# Patient Record
Sex: FEMALE | Race: BLACK OR AFRICAN AMERICAN | NOT HISPANIC OR LATINO | URBAN - METROPOLITAN AREA
[De-identification: names, ages, dates, MRNs, and addresses within clinical notes are randomized per-mention and may not be internally consistent; named-entity substitution may affect disease eponyms.]

---

## 2021-01-09 ENCOUNTER — EMERGENCY (EMERGENCY)
Facility: HOSPITAL | Age: 46
LOS: 1 days | Discharge: HOME | End: 2021-01-09
Attending: EMERGENCY MEDICINE | Admitting: EMERGENCY MEDICINE
Payer: MEDICAID

## 2021-01-09 VITALS
DIASTOLIC BLOOD PRESSURE: 76 MMHG | TEMPERATURE: 99 F | HEART RATE: 109 BPM | OXYGEN SATURATION: 98 % | RESPIRATION RATE: 18 BRPM | SYSTOLIC BLOOD PRESSURE: 149 MMHG

## 2021-01-09 LAB
ALBUMIN SERPL ELPH-MCNC: 3.6 G/DL — SIGNIFICANT CHANGE UP (ref 3.3–5)
ALP SERPL-CCNC: 71 U/L — SIGNIFICANT CHANGE UP (ref 40–120)
ALT FLD-CCNC: 14 U/L — SIGNIFICANT CHANGE UP (ref 4–33)
ANION GAP SERPL CALC-SCNC: 8 MMOL/L — SIGNIFICANT CHANGE UP (ref 7–14)
AST SERPL-CCNC: 16 U/L — SIGNIFICANT CHANGE UP (ref 4–32)
BASOPHILS # BLD AUTO: 0.01 K/UL — SIGNIFICANT CHANGE UP (ref 0–0.2)
BASOPHILS NFR BLD AUTO: 0.1 % — SIGNIFICANT CHANGE UP (ref 0–2)
BILIRUB SERPL-MCNC: 0.3 MG/DL — SIGNIFICANT CHANGE UP (ref 0.2–1.2)
BUN SERPL-MCNC: 10 MG/DL — SIGNIFICANT CHANGE UP (ref 7–23)
CALCIUM SERPL-MCNC: 8.8 MG/DL — SIGNIFICANT CHANGE UP (ref 8.4–10.5)
CHLORIDE SERPL-SCNC: 102 MMOL/L — SIGNIFICANT CHANGE UP (ref 98–107)
CO2 SERPL-SCNC: 25 MMOL/L — SIGNIFICANT CHANGE UP (ref 22–31)
CREAT SERPL-MCNC: 0.89 MG/DL — SIGNIFICANT CHANGE UP (ref 0.5–1.3)
EOSINOPHIL # BLD AUTO: 0.03 K/UL — SIGNIFICANT CHANGE UP (ref 0–0.5)
EOSINOPHIL NFR BLD AUTO: 0.3 % — SIGNIFICANT CHANGE UP (ref 0–6)
GLUCOSE SERPL-MCNC: 111 MG/DL — HIGH (ref 70–99)
HCT VFR BLD CALC: 43.2 % — SIGNIFICANT CHANGE UP (ref 34.5–45)
HGB BLD-MCNC: 13.3 G/DL — SIGNIFICANT CHANGE UP (ref 11.5–15.5)
IANC: 6.55 K/UL — SIGNIFICANT CHANGE UP (ref 1.5–8.5)
IMM GRANULOCYTES NFR BLD AUTO: 0.3 % — SIGNIFICANT CHANGE UP (ref 0–1.5)
LYMPHOCYTES # BLD AUTO: 1.12 K/UL — SIGNIFICANT CHANGE UP (ref 1–3.3)
LYMPHOCYTES # BLD AUTO: 12.8 % — LOW (ref 13–44)
MCHC RBC-ENTMCNC: 27.4 PG — SIGNIFICANT CHANGE UP (ref 27–34)
MCHC RBC-ENTMCNC: 30.8 GM/DL — LOW (ref 32–36)
MCV RBC AUTO: 89.1 FL — SIGNIFICANT CHANGE UP (ref 80–100)
MONOCYTES # BLD AUTO: 0.98 K/UL — HIGH (ref 0–0.9)
MONOCYTES NFR BLD AUTO: 11.2 % — SIGNIFICANT CHANGE UP (ref 2–14)
NEUTROPHILS # BLD AUTO: 6.55 K/UL — SIGNIFICANT CHANGE UP (ref 1.8–7.4)
NEUTROPHILS NFR BLD AUTO: 75.3 % — SIGNIFICANT CHANGE UP (ref 43–77)
NRBC # BLD: 0 /100 WBCS — SIGNIFICANT CHANGE UP
NRBC # FLD: 0 K/UL — SIGNIFICANT CHANGE UP
PLATELET # BLD AUTO: 274 K/UL — SIGNIFICANT CHANGE UP (ref 150–400)
POTASSIUM SERPL-MCNC: 3.8 MMOL/L — SIGNIFICANT CHANGE UP (ref 3.5–5.3)
POTASSIUM SERPL-SCNC: 3.8 MMOL/L — SIGNIFICANT CHANGE UP (ref 3.5–5.3)
PROT SERPL-MCNC: 7.8 G/DL — SIGNIFICANT CHANGE UP (ref 6–8.3)
RBC # BLD: 4.85 M/UL — SIGNIFICANT CHANGE UP (ref 3.8–5.2)
RBC # FLD: 14.7 % — HIGH (ref 10.3–14.5)
SODIUM SERPL-SCNC: 135 MMOL/L — SIGNIFICANT CHANGE UP (ref 135–145)
WBC # BLD: 8.72 K/UL — SIGNIFICANT CHANGE UP (ref 3.8–10.5)
WBC # FLD AUTO: 8.72 K/UL — SIGNIFICANT CHANGE UP (ref 3.8–10.5)

## 2021-01-09 PROCEDURE — 99284 EMERGENCY DEPT VISIT MOD MDM: CPT

## 2021-01-09 PROCEDURE — 70487 CT MAXILLOFACIAL W/DYE: CPT | Mod: 26

## 2021-01-09 RX ORDER — IBUPROFEN 200 MG
1 TABLET ORAL
Qty: 12 | Refills: 0
Start: 2021-01-09 | End: 2021-01-11

## 2021-01-09 RX ORDER — KETOROLAC TROMETHAMINE 30 MG/ML
15 SYRINGE (ML) INJECTION ONCE
Refills: 0 | Status: DISCONTINUED | OUTPATIENT
Start: 2021-01-09 | End: 2021-01-09

## 2021-01-09 RX ADMIN — Medication 450 MILLIGRAM(S): at 11:30

## 2021-01-09 RX ADMIN — Medication 15 MILLIGRAM(S): at 10:20

## 2021-01-09 NOTE — ED ADULT TRIAGE NOTE - CHIEF COMPLAINT QUOTE
pt c/o left sided tooth pain and facial swelling. pt states the tooth pain has been going on for two days and she woke up this Am with swelling to the left side of face. Denies fevers/chills.

## 2021-01-09 NOTE — ED PROVIDER NOTE - RESPIRATORY NEGATIVE STATEMENT, MLM
Post-Care Instructions: I reviewed with the patient in detail post-care instructions. Patient is to wear sunprotection, and avoid picking at any of the treated lesions. Pt may apply polysporin to crusted or scabbing areas. Consent: The patient's consent was obtained including but not limited to risks of crusting, scabbing, blistering, scarring, darker or lighter pigmentary change, recurrence, incomplete removal and infection. Number Of Freeze-Thaw Cycles: 2 freeze-thaw cycles Render Post-Care Instructions In Note?: no Duration Of Freeze Thaw-Cycle (Seconds): 5 Detail Level: Detailed no chest pain, no cough, and no shortness of breath.

## 2021-01-09 NOTE — ED PROVIDER NOTE - ENMT, MLM
Face is asymmetrical do to swelling on left cheek, no erythema, no fluctuance, no swelling. Oral exam shows normal teeth, with multiple fillings, no gum swelling at area of facial pain. Throat is clear. Tongue, and uvular  is midline. Face is asymmetrical do to swelling on left cheek; no erythema, no fluctuance, no discharge. Oral exam shows normal teeth, with multiple fillings, no gum swelling at area of facial pain. Throat is clear. Tongue, and uvula are midline.

## 2021-01-09 NOTE — ED PROVIDER NOTE - EYES, MLM
EOMI, Clear bilaterally, pupils equal, round and reactive to light. EOMI, Clear bilaterally, pupils equal, round and reactive to light.  No pain with extraocular movement.

## 2021-01-09 NOTE — ED PROVIDER NOTE - PROGRESS NOTE DETAILS
Dr. Petersen: imaging and labs reviewed--WBC reassuring, CT with facial cellulitis but no drainage collection.  Pt informed of results.  She is very well appearing, in NAD, feels much better after Toradol.  With no drainable collection and pt very well appearing, will give PO dose of clinda and discharge with pain control and remainder of Rx clinda.  Pt has a dentist and was advised that she must follow up with her dentist within 1 week to see if there is an odontogenic cause of this (no gum swelling on oral exam, but cannot exclude other dental disease).  Also will place pt sticker in discharge lounge binder to assist with dental and plastics follow up (if swelling not improving).  Strict return precautions given for worsening symptoms/difficulty swallowing/any concerns.  Pt tolerating PO at time of discharge (drank water to take PO medication).

## 2021-01-09 NOTE — ED PROVIDER NOTE - PATIENT PORTAL LINK FT
You can access the FollowMyHealth Patient Portal offered by [facility] by registering at the following website: [email or web address]. By joining [facility]’s FollowMyHealth portal, you will also be able to view your health information using other applications (apps) compatible with our system.

## 2021-01-09 NOTE — ED PROVIDER NOTE - OBJECTIVE STATEMENT
44 y/o F with no PMHx presents to the ED c/o 2 day of pain left sided cheek/mouth and 1 day of swelling to left cheek. Pt denies fever, visual changes, sore throat no difficulty swallowing. Pt is able to tolerate PO, but not on affected side. Pt denies any recent dental work. Pt took 1 dose of Clindamycin that her mother gave her last night. 46 y/o F with no PMHx presents to the ED c/o 2 day of pain left sided cheek/mouth and 1 day of swelling to left cheek. Pt denies fever, visual changes, sore throat, no difficulty swallowing. Pt is able to tolerate PO, but not on affected side due to pain in cheek. Pt denies any recent dental work. Pt took 1 dose of Clindamycin that her mother gave her last night.

## 2021-01-09 NOTE — ED PROVIDER NOTE - NSFOLLOWUPINSTRUCTIONS_ED_ALL_ED_FT
1. TAKE ALL OF YOUR PRESCRIBED OR OVER THE COUNTER MEDICATIONS AS DIRECTED.    2. FOR PAIN OR FEVER YOU CAN TAKE IBUPROFEN (MOTRIN, ADVIL), NAPROXEN (ALLEVE) OR ACETAMINOPHEN (TYLENOL) AS NEEDED, AS DIRECTED ON PACKAGING.  3. FOLLOW UP WITH YOUR DENTIST WITHIN 7 DAYS, OR SOONER IF DIRECTED.  IF YOUR FACIAL SWELLING DOES NOT BEGIN TO IMPROVE WITHIN 1 WEEK, MAKE AN APPOINTMENT WITH A PLASTIC SURGEON BY CALLING [phone] FOR THE CLINIC, OR BY CALLING ANYONE ON THE FOLLOW UP LIST YOU WERE GIVEN.  4. IF YOU HAD LABS OR IMAGING DONE, YOU WERE GIVEN COPIES OF ALL LABS AND/OR IMAGING RESULTS FROM YOUR ER VISIT--PLEASE TAKE THEM WITH YOU TO YOUR FOLLOW UP APPOINTMENTS.  5. IF NEEDED, CALL PATIENT ACCESS SERVICES AT [phone] TO FIND A PRIMARY CARE PHYSICIAN.  OR CALL [phone] TO MAKE AN APPOINTMENT WITH THE CLINIC.  6. YOU CAN FIND PHYSICIANS OF ALL SPECIALITIES BY VISITING [facility] AND CLICKING ON "FIND A DOCTOR".  7. RETURN TO THE ER FOR ANY WORSENING SYMPTOMS OR CONCERNS.    THANK YOU FOR COMING TO [facility].  HAVE A NICE DAY.

## 2021-01-09 NOTE — ED PROVIDER NOTE - CLINICAL SUMMARY MEDICAL DECISION MAKING FREE TEXT BOX
44 y/o F with no PMHx presents to the ED c/o 2 day of pain left sided cheek/mouth and 1 day of swelling to left cheek.   Consider preseptal cellulitis vs facial abscess unlikely tooth infection. Plan to treat pain, and get imaging and reassess.

## 2021-12-22 ENCOUNTER — EMERGENCY (EMERGENCY)
Facility: HOSPITAL | Age: 46
LOS: 1 days | Discharge: HOME | End: 2021-12-22
Admitting: EMERGENCY MEDICINE
Payer: MEDICAID

## 2021-12-22 VITALS
HEART RATE: 109 BPM | OXYGEN SATURATION: 100 % | DIASTOLIC BLOOD PRESSURE: 200 MMHG | RESPIRATION RATE: 18 BRPM | TEMPERATURE: 98 F | WEIGHT: 261.91 LBS | SYSTOLIC BLOOD PRESSURE: 92 MMHG

## 2021-12-22 VITALS — SYSTOLIC BLOOD PRESSURE: 162 MMHG | DIASTOLIC BLOOD PRESSURE: 106 MMHG

## 2021-12-22 PROBLEM — Z78.9 OTHER SPECIFIED HEALTH STATUS: Chronic | Status: ACTIVE | Noted: 2021-01-09

## 2021-12-22 LAB — SARS-COV-2 RNA SPEC QL NAA+PROBE: SIGNIFICANT CHANGE UP

## 2021-12-22 PROCEDURE — 99282 EMERGENCY DEPT VISIT SF MDM: CPT

## 2021-12-22 NOTE — ED PROVIDER NOTE - CLINICAL SUMMARY MEDICAL DECISION MAKING FREE TEXT BOX
47 y/o female asymptomatic - requesting covid19 test  -will send covid19 pcr  -pt to follow up results  -isolation precautions given

## 2021-12-22 NOTE — ED PROVIDER NOTE - OBJECTIVE STATEMENT
47 y/o female hx HTN presents to ER requesting a covid test. Pt. 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 Pt. denies any complaints at this time. Denies headache fever chills cough sob uri symptoms.   Vaccinated for covid19.

## 2021-12-22 NOTE — ED PROVIDER NOTE - NSFOLLOWUPINSTRUCTIONS_ED_ALL_ED_FT
COVID-19 (Coronavirus Disease 2019)    WHAT YOU NEED TO KNOW:    What do I need to know about coronavirus disease 2019 (COVID-19)? COVID-19 is the disease caused by the novel (new) coronavirus first discovered in December 2019. Coronaviruses generally cause upper respiratory (nose, throat, and lung) infections, such as a cold. The new virus can also cause serious lower respiratory conditions, such as pneumonia or acute respiratory distress syndrome (ARDS). Anyone can develop serious problems from the new virus, but your risk is higher if you are 65 or older. A weak immune system, diabetes, or a heart or lung condition can also increase your risk.    What are the signs and symptoms of COVID-19? You may not develop any signs or symptoms. Signs and symptoms that do develop usually start about 5 days after infection but can take 2 to 14 days. Signs and symptoms range from mild to severe. You may feel like you have the flu or a bad cold. Information on COVID-19 is still being learned. Tell your healthcare provider if you think you were infected but develop signs or symptoms not listed below:  •A cough  •Shortness of breath or trouble breathing that may become severe  •A fever of at least 100.4°F, or 38°C (may be lower in adults 65 or older)  •Chills that might include shaking  •Muscle pain, body aches, or a headache  •A sore throat  •Suddenly not being able to taste or smell anything  •Feeling mentally and physically tired (fatigue)  •Congestion (stuffy head and nose), or a runny nose.  •Diarrhea, nausea, or vomiting    How is COVID-19 diagnosed? If you think you have COVID-19, call your healthcare provider. In some areas, testing is only done if a person has severe symptoms or is hospitalized. Testing is done more widely in other places. Your provider will tell you what to do based on your symptoms and the rules in your area. In general, the following may be used:   •A viral test shows if you have a current infection. Samples are taken from your nose and throat, usually with swabs. You may need to wait several days to get the test results. Your healthcare provider will tell you how to get your results. You will need to quarantine (stay physically away from others) until you get your results. If results show you have COVID-19, you will need to quarantine until you are well. Your provider or other health official may give you more directions. You will also need to prevent another infection until it is known if you can get COVID-19 again.  •An antibody test shows if you had a past infection. Blood samples are used for this test. Antibodies are made by your immune system to attack the virus that causes COVID-19. Antibodies will form 1 to 3 weeks after you are infected. It is not known if antibodies prevent a second infection, or for how long a person might be protected. If you have antibodies, you will still need to be careful around others until more is known.  •CT scans or x-rays may be used to check for signs of pneumonia. The 2019 coronavirus causes a specific kind of pneumonia, usually in both lungs.      How is COVID-19 treated? No medicine or specific treatment is currently approved for COVID-19. The following may be used to manage your symptoms or treat the effects of COVID-19:   •Mild symptoms may get better on their own. If you do not need to be treated in a hospital, you will be given instructions to use at home. Your condition will be closely monitored. You will need to watch for worsening symptoms and seek immediate care if needed. Talk to your healthcare provider about the following:?Relieve your symptoms. To soothe a sore throat, gargle with warm salt water, or use throat lozenges or a throat spray. Your healthcare provider may recommend a cough medicine. Drink more liquids to thin and loosen mucus and to prevent dehydration. Use decongestants or saline drops as directed for nasal congestion.  ?NSAIDs or acetaminophen can help lower a fever and relieve body aches or a headache. Follow directions. If not taken correctly, NSAIDs can cause kidney damage and acetaminophen can cause liver damage.    •Severe or life-threatening symptoms are treated in the hospital. You may need a combination of the following:?Medicines may be given to reduce inflammation or to fight the virus. You may also need blood thinners to prevent or treat blood clots. If you have a deep vein thrombosis (DVT) or pulmonary embolism (PE), you may need to keep using blood thinners for 3 months.  ?Extra oxygen may be given if you have respiratory failure. This means your lungs cannot get enough oxygen into your blood and out to your organs. Extra oxygen can help prevent organ failure.  ?A ventilator may be used to help you breathe.  ?Convalescent plasma (part of blood) from a patient who has recovered from COVID-19 may be used. The plasma contains antibodies that can help your body fight the infection. Convalescent plasma is only given to patients who have severe signs and symptoms.        How does the 2019 coronavirus spread? The virus spreads quickly and easily. You can become infected if you are in contact with a large amount of the virus, even for a short time. You can also become infected by being around a small amount of virus for a long time. The following are ways the virus is thought to spread, but more information may be coming:   •Droplets are the most common way all coronaviruses spread. The virus can travel in droplets that form when a person talks, coughs, or sneezes. Anyone who breathes in the droplets or gets them in his or her eyes can become infected with the virus. Close personal contact with an infected person is thought to be the main way the virus spreads. Close personal contact means you are within 6 feet (2 meters) of the person.  •Person-to-person contact can spread the virus. For example, a person with the virus on his or her hands can spread it by shaking hands with someone. At this time, it does not appear that the virus can be passed to a baby during pregnancy or delivery. The baby can be infected after he or she is born through person-to-person contact. The virus also does not appear to spread in breast milk. If you are pregnant or breastfeeding, talk to your healthcare provider or obstetrician about any concerns you have.  •The virus can stay on objects and surfaces. A person can get the virus on his or her hands by touching the object or surface. Infection happens if the person then touches his or her eyes or mouth with unwashed hands. It is not yet known how long the virus can stay on an object or surface. That is why it is important to clean all surfaces that are used regularly.  •An infected animal may be able to infect a person who touches it. This may happen at live markets or on a farm.      How can everyone lower the risk for COVID-19? The best way to prevent infection is to avoid anyone who is infected, but this can be hard to do. An infected person can spread the virus before signs or symptoms begin, or even if signs or symptoms never develop. The following can help lower the risk for infection:   Limit the Spread of Infectious Disease    •Wash your hands often throughout the day. Use soap and water. Rub your soapy hands together, lacing your fingers. Wash the front and back of each hand, and in between your fingers. Use the fingers of one hand to scrub under the fingernails of the other hand. Wash for at least 20 seconds. Rinse with warm, running water for several seconds. Then dry your hands with a clean towel or paper towel. Use hand  that contains alcohol if soap and water are not available. Do not touch your eyes, nose, or mouth without washing your hands first. Teach children how to wash their hands and use hand .  Handwashing  •Cover a sneeze or cough. This prevents droplets from traveling from you to others. Turn your face away and cover your mouth and nose with a tissue. Throw the tissue away. Use the bend of your arm if a tissue is not available. Then wash your hands well with soap and water or use hand . Turn and cover your face if you are around someone who is sneezing or coughing. Teach children how to cover a cough or sneeze.  •Follow worldwide, national, and local social distancing guidelines. Social distancing means people avoid close physical contact so the virus cannot spread from one person to another. Keep at least 6 feet (2 meters) between you and others. Also keep this distance from anyone who comes to your home, such as someone making a delivery.  •Make a habit of not touching your face. It is not known how long the virus can stay on objects and surfaces. If you get the virus on your hands, you can transfer it to your eyes, nose, or mouth and become infected. You can also transfer it to objects, surfaces, or people. Be aware of what you touch when you go out. Examples include handrails and elevator buttons. Try not to touch anything with bare hands unless it is necessary. Wash your hands before you leave your home and when you return.  •Clean and disinfect high-touch surfaces and objects often. Use a disinfecting solution or wipes. You can make a solution by diluting 4 teaspoons of bleach in 1 quart (4 cups) of water. Clean and disinfect even if you think no one living in or coming to your home is infected with the virus. You can wipe items with a disinfecting cloth before you bring them into your home. Wash your hands after you handle anything you bring into your home.  •Make your immune system as healthy as possible. A weakened immune system makes you more vulnerable to the new coronavirus. No COVID-19 vaccine is available yet. Vaccines such as the flu and pneumonia vaccines can help your immune system. Your healthcare provider can tell you which vaccines to get, and when to get them. Keep your immune system as strong as possible. Do not smoke. Eat healthy foods, exercise regularly, and try to manage stress. Go to bed and wake up at the same times each day.        How do I follow social distancing guidelines to help lower the risk for COVID-19? National and local social distancing rules vary. Rules may change over time as restrictions are lifted. Restrictions may return if an outbreak happens where you live. It is important to know and follow all current social distancing rules in your area. The following are general guidelines:  •Limit trips out of your home. You may be able to have food, medicines, and other supplies delivered. If possible, have delivered items left at your door or other area. Try not to have someone hand you an item. You will be so close to the person that the virus can spread between you.  •Do not have close physical contact with anyone who does not live in your home. Do not shake hands with, hug, or kiss a person as a greeting. Stand or walk as far from others as possible. If you must use public transportation (such as a bus or subway), try to sit or stand away from others. You can stay safely connected with others through phone calls, e-mail messages, social media websites, and video chats. Check in on anyone who may be having a hard time socially distancing, or who lives alone. Ask administrators at nursing homes or long-term care facilities how you can safely communicate with someone living there.  •Wear a cloth face covering around others who do not live in your home. Face coverings help prevent the virus from spreading to others in droplets. You can use a clear face covering if someone needs to read your lips. This is a cloth covering that has plastic over the mouth area so your lips can be seen. Do not use coverings that have breathing valves or vents. The virus can travel out of the valve or vent and be spread to others. Do not take your covering off to talk, cough, or sneeze. Do not use coverings on children younger than 2 years or on anyone who has breathing problems or cannot remove it.  •Only allow medical or other necessary professionals into your home. Wear your face covering, and remind professionals to wear a face covering. Remind them to wash their hands when they arrive and before they leave. Do not let anyone who does not live in your home in, even if the person is not sick. A person can pass the virus to others before symptoms of COVID-19 begin. Some people never even develop symptoms. Children commonly have mild symptoms or no symptoms. It may be hard to tell a child not to hug or kiss you. Explain that this is how he or she can help you stay healthy.  •Do not go to someone else's home unless it is necessary. Do not go over to visit, even if the person is lonely. Only go if you need to help him or her. Make sure you both wear face coverings while you are there.  •Avoid large gatherings and crowds. Gatherings or crowds of 10 or more individuals can cause the virus to spread. Examples of gatherings include parties, sporting events, [employer] day services, and conferences. Crowds may form at beaches, yoder, and tourist attractions. Protect yourself by staying away from large gatherings and crowds.  •Ask your healthcare provider for other ways to have appointments. You may be able to have appointments without having to go into the provider's office. Some providers offer phone, video, or other types of appointments. You may also be able to get prescriptions for a few months of your medicines at a time.  •Stay safe if you must go out to work. You may have a job that can only be done outside your home. Keep physical distance between you and other workers as much as possible. Follow your employer's rules so everyone stays safe.      What should I do if I have COVID-19 and am recovering at home? Healthcare providers will give you specific instructions to follow. The following are general guidelines to remind you how to keep others safe until you are well:   •Wash your hands often. Use soap and water as much as possible. You can use hand  that contains alcohol if soap and water are not available. Do not share towels with anyone. If you use paper towels, throw them away in a lined trash can kept in your room or area. Use a covered trash can, if possible.  •Do not go out of your home unless it is necessary. You may have to go to your healthcare provider's office for check-ups or to get prescription refills. Do not arrive at the provider's office without an appointment. Providers have to make their offices safe for staff and other patients.  •Do not have close physical contact with anyone unless it is necessary. Only have close physical contact with a person giving direct care, or a baby or child you must care for. Family members and friends should not visit you. If possible, stay in a separate area or room of your home if you live with others. No one should go into the area or room except to give you care. You can visit with others by phone, video chat, e-mail, or similar systems. It is important to stay connected with others in your life while you recover.  •Wear a face covering while others are near you. This can help prevent droplets from spreading the virus when you talk, sneeze, or cough. Put the covering on before anyone comes into your room or area. Remind the person to cover his or her nose and mouth before going in to provide care for you.  •Do not share items. Do not share dishes, towels, or other items with anyone. Items need to be washed after you use them.  •Protect your baby. Wash your hands with soap and water often throughout the day. Wear a clean face covering while you breastfeed, or while you express or pump breast milk. If possible, ask someone who is well to care for your baby. You can put breast milk in bottles for the person to use, if needed. Talk to your healthcare provider if you have any questions or concerns about caring for or bonding with your baby. He or she will tell you when to bring your baby in for check-ups and vaccines. He or she will also tell you what to do if you think your baby was infected with the new virus.  •Do not handle live animals. Until more is known, it is best not to touch, play with, or handle live animals. Some animals, including pets, have been infected with the new coronavirus. Do not handle or care for animals until you are well. Care includes feeding, petting, and cuddling your pet. Do not let your pet lick you or share your food. Ask someone who is not infected to take care of your pet, if possible. If you must care for a pet, wear a face covering. Wash your hands before and after you give care.  •Follow directions from your healthcare provider for being around others after you recover. You will need to wait at least 10 days after symptoms first appeared. Then you will need to have no fever for 24 hours without fever medicine, and no other symptoms. A loss of taste or smell may continue for several months. It is considered okay to be around others if this is your only symptom. It is not known for sure if or for how long a recovered person can pass the virus to others. Your provider may give you instructions, such as continuing social distancing or wearing a face covering around others.  How should I take care of someone who has COVID-19? If the person lives in another home, arrange for a time to give care. Remember to bring a few pairs of disposable gloves and a cloth face covering. The following are general guidelines to help you safely care for anyone who has COVID-19:  •Wash your hands often. Wash before and after you go into the person's home, area, or room. Throw paper towels away in a lined trash can that has a lid, if possible.  •Do not allow others to go near the person. No one should come into the person's home unless it is necessary. If possible, the person should be in a separate area or room if he or she lives with others. Keep the room's door shut unless you need to go in or out. Have others call, video chat, or e-mail the person if he or she is feeling well enough. The person may feel lonely if he or she is kept separate for a long period of time. Safe communication can help him or her stay connected to family and friends.  •Make sure the person's room has good air flow. You may be able to open the window if the weather allows. An air conditioner can also be turned on to help air move.  •Contact the person before you go in to give care. Make sure the person is wearing a face covering. Remind him or her to wash his or her hands with soap and water. He or she can use hand  that contains alcohol if soap and water are not available. Put on a face covering before you go in to give care.  •Wear gloves while you give care and clean. Clean items the person uses often. Clean countertops, cooking surfaces, and the fronts and insides of the microwave and refrigerator. Clean the shower, toilet, the area around the toilet, the sink, the area around the sink, and faucets. Gather used laundry or bedding. Wash and dry items on the warmest settings the fabric allows. Wash dishes and silverware in hot, soapy water or in a .  •Anything you throw away needs to go into a lined trash can. When you need to empty the trash, close the open end of the lining and tie it closed. This helps prevent items the virus is on from spilling out of the trash. Remove your gloves and throw them away. Wash your hands.      Where can I find more information?   •[facility] [employer] [facility] Prevention  [address] [place] [address]  Phone: 1-270.797.6408  Web Address: http://www.cdc.gov    What should I do if I think I or someone I know may be infected? Do the following to protect others:   •If emergency care is needed, tell the  about the possible infection, or call ahead and tell the emergency department.  •Call a healthcare provider for instructions if symptoms are mild. Anyone who may be infected should not arrive without calling first. The provider will need to protect staff members and other patients.  •The person who may be infected needs to wear a face covering while getting medical care. This will help lower the risk of infecting others. Coverings are not used for anyone who is younger than 2 years, has breathing problems, or cannot remove it. The provider can give you instructions for anyone who cannot wear a covering.      Call your local emergency number (911 in the ) or an emergency department if:   •You have trouble breathing or shortness of breath at rest.  •You have chest pain or pressure that lasts longer than 5 minutes.  •You become confused or hard to wake.  •Your lips or face are blue.  •You have a fever of 104°F (40°C) or higher.  When should I call my doctor?   •You do not have symptoms of COVID-19 but had close physical contact within 14 days with someone who tested positive.  •You have questions or concerns about your condition or care.      CARE AGREEMENT:  You have the right to help plan your care. Learn about your health condition and how it may be treated. Discuss treatment options with your healthcare providers to decide what care you want to receive. You always have the right to refuse treatment.       COVID-19 (Enfermedad por coronavirus 2019)  LO QUE NECESITA SABER:  ¿Qué necesito saber acerca de la enfermedad por coronavirus 2019 (COVID-19)?COVID-19 es la enfermedad causada por el nuevo coronavirus descubierto por primera vez en diciembre de 2019. Los coronavirus generalmente causan infecciones de las vías respiratorias superiores (nariz, garganta y pulmones), robbie un resfriado. El nuevo virus también puede causar afecciones respiratorias inferiores graves, robbie la neumonía o el síndrome de dificultad respiratoria aguda (SDRA). Cualquier persona puede desarrollar problemas graves a causa del nuevo virus, fabiana el riesgo es mayor si tiene 65 años o más. Un sistema inmunitario débil, la diabetes o russ enfermedad cardíaca o pulmonar también pueden aumentar el riesgo.    ¿Cuáles son los signos y síntomas de la COVID-19?Es posible que no presente ningún signo o síntoma. Los signos y síntomas que se presentan suelen empezar unos 5 días después de la infección fabiana pueden tardar de 2 a 14 días. Los signos y síntomas pueden variar de leves a severos. Puede sentir robbie si tuviera gripe o un resfriado lubna. La información sobre COVID-19 todavía se está aprendiendo. Dígale a altman médico si rosa que se ha infectado fabiana desarrolla signos o síntomas que no se enumeran a continuación:  •Tos  •Falta de aliento o dificultad para respirar que puede llegar a ser grave  •Russ fiebre de, al menos, 100.4 °F, o 38 °C (puede ser más baja en los adultos de 65 años o más)  •Escalofríos que pueden incluir temblores  •Dolor muscular, alysia corporales o dolor de natacha  •El dolor de garganta  •De repente, no ser capaz de probar u oler nada  •Sensación de cansancio físico y mental (fatiga)  •Congestión (de la nariz y la natacha) o flujo nasal  •Diarrea, náuseas o vómitos  ¿Cómo se diagnostica la COVID-19?Llame a altman médico si piensa que puede tener COVID-19. En algunas zonas, solo se realizan pruebas si russ persona tiene síntomas graves o es hospitalizada. Las pruebas se hacen más ampliamente en otros lugares. Altman médico le dirá lo que debe hacer basándose en heidi síntomas y en las normas de altman cherelle. En general, se puede utilizar lo siguiente:   •Un examen viralmuestra si tiene russ infección actualmente. Se jermain muestras de la nariz y la garganta, usualmente con hisopos. Es posible que tenga que esperar varios días para obtener los resultados de la prueba. Altman médico le dirá cómo obtener los resultados. Tendrá que ponerse en cuarentena (mantenerse físicamente alejado de los demás) hasta que obtenga los resultados. Si los resultados muestran que tiene COVID-19, tendrá que ponerse en cuarentena hasta que esté milli. Altman médico u otro oficial de luis a pueden darle más instrucciones. También tendrá que prevenir otra infección hasta que se sepa si puede contraer COVID-19 de nuevo.  •Russ prueba de anticuerposmuestra si tuvo russ infección en el pasado. Para esta prueba se utilizan muestras de noe. Los anticuerpos son producidos por el sistema inmunitario para atacar el virus que causa la COVID-19. Los anticuerpos se formarán de 1 a 3 semanas después de que se contagie. No se sabe si los anticuerpos previenen russ segunda infección, o por cuánto tiempo russ persona podría estar protegida. Si tiene anticuerpos, tendrá que tener cuidado con los demás hasta que se sepa más.  •Tomografías o radiografíaspodrían realizarse para comprobar si existen signos de neumonía. El coronavirus 2019 causa un tipo específico de neumonía, generalmente en ambos pulmones.    ¿Cómo se trata la COVID-19?Ningún medicamento o tratamiento específico está actualmente aprobado para la COVID-19. Lo siguiente puede utilizarse para controlar los síntomas o tratar los efectos de la COVID-19:   •Los síntomas levespodrían mejorar por sí solos. Si no necesita ser tratado en un hospital, se le darán instrucciones para que siga en altman casa. Controlarán atentamente altman estado. Deberá estar atento al empeoramiento de los síntomas y buscar atención inmediata si es necesario. Hable con altman médico acerca de lo siguiente:?Aliviar los síntomas.Para aliviar el dolor de garganta, sruthi gárgaras con agua salada tibia, o use pastillas para la garganta o un aerosol para la garganta. Altman médico puede recomendarle un medicamento para la tos. Linh más líquidos para disolver y aflojar la mucosidad y para prevenir la deshidratación. Use descongestionantes o gotas de solución salina robbie se indica para la congestión nasal.  ?Los DEVYN o el acetaminofenopueden ayudar a bajar la fiebre y aliviar los alysia corporales o el dolor de natacha. Siga las indicaciones. Si no se jermain correctamente, los DEVYN pueden causar sangrado estomacal o daño renal y el acetaminofeno puede dañar hepático.  •Los síntomas severos o potencialmente mortalesse tratan en el hospital. Es posible que usted necesite russ combinación de los siguientes:?Los medicamentospueden administrarse para reducen la inflamación o combatir el virus. También podría necesitar anticoagulantes para prevenir o tratar los coágulos de noe. Si tiene trombosis venosa profunda (TVP) o embolia pulmonar (PE), amanda vez necesite seguir usando anticoagulantes alicia 3 meses.  ?El oxígeno adicionalpodría administrarse si tiene insuficiencia respiratoria. [place] significa que los pulmones no pueden llevar suficiente oxígeno a la noe y a los órganos. El oxígeno extra puede ayudar a prevenir la insuficiencia orgánica.  ?Un respiradorpodría usarse para ayudarlo a respirar.  ?El plasma (parte de la noe) de convalecientede un paciente que se ha recuperado de la COVID-19 puede utilizarse. El plasma contiene anticuerpos que pueden ayudar a altman cuerpo a combatir la infección. El plasma de convaleciente solo se administra a pacientes que tienen signos y síntomas severos.  ¿Cómo se propaga el coronavirus 2019?El virus se propaga rápida y fácilmente. Puede infectarse si está en contacto con russ gran cantidad del virus, incluso alicia poco tiempo. También puede infectarse por estar cerca de russ pequeña cantidad del virus alicia mucho tiempo. A continuación se indican las formas en que se rosa que se propaga el virus, fabiana es posible que surja más información:   •Las gotitas son la forma más común de propagación de todos los coronavirus.El virus puede viajar en gotitas que se inderjit cuando russ persona habla, tose o estornuda. Cualquiera que respire las gotitas o que las gotitas se le metan en los ojos puede infectarse con el virus. Se rosa que el contacto personal cercano con russ persona infectada es la principal forma de propagación del virus. El contacto personal cercano significa estar a menos de 6 pies (2 metros) de otra persona.  •El contacto de persona a persona puede propagar el virus.Por ejemplo, russ persona con el virus en heidi mela puede propagarlo al darle la mano a alguien. En karena momento, no parece que el virus pueda transmitirse a un bebé alicia el embarazo o el parto. El bebé puede infectarse después de nacer por contacto de persona a persona. El virus tampoco parece propagarse por la leche materna. Si está embarazada o amamantando, hable con altman médico u obstetra sobre cualquier preocupación que tenga.  •El virus puede permanecer en objetos y superficies.Russ persona puede contraer el virus en heidi mela al tocar el objeto o la superficie. La infección se produce si la persona se toca los ojos o la boca sin antes lavarse las mela. Aún no se sabe cuánto tiempo puede permanecer el virus en un objeto o superficie. Por eso es importante limpiar todas las superficies que se usan regularmente.  •Un animal infectado puede ser capaz de infectar a russ persona que lo toque.[place] puede ocurrir en mercados vivos o en russ mina.  ¿Cómo puede todo el alfredo reducir el riesgo de COVID-19?La mejor manera de prevenir la infección es evitar a cualquiera que esté infectado, fabiana esto puede ser difícil de lograr. Russ persona infectada puede propagar el virus antes de que aparezcan los signos o síntomas, o incluso si los signos o síntomas nunca se desarrollan. Lo siguiente puede ayudar a reducir el riesgo de infección:   Limite la propagación de las enfermedades infecciosas  •Lávese las mela con frecuencia alicia el día.Utilice agua y jabón. Frótese las mela enjabonadas, entrelazando los dedos. Lávese el frente y el dorso de cada mano, y entre los dedos. Use los dedos de russ mano para restregar debajo de las uñas de la otra mano. Lávese alicia al menos 20 segundos. Enjuague con agua corriente caliente alicia varios segundos. Luego séquese las mela con russ toalla limpia o russ toalla de papel. Puede usar un desinfectante para mela que contenga alcohol, si no hay agua y jabón disponibles. No se toque los ojos, la nariz o la boca sin antes lavarse las mela. Enseñe a los niños a lavarse las mela y a usar el desinfectante de mela.  Lavado de mela  •Cúbrase al toser o estornudar.[place] zander que las gotitas viajen de usted a los demás. Gire la zoie y cúbrase la boca y la nariz con un pañuelo. Deseche el pañuelo. Use el ángulo del brazo si no tiene un pañuelo disponible. Luego lávese las mela con agua y jabón o use un desinfectante de mela. Gire la natacha y cúbrase si está cerca de alguien que está estornudando o tosiendo. Enséñeles a los niños a cubrirse al toser o estornudar.  •Siga las pautas de distanciamiento social a nivel local, nacional y mundial.El distanciamiento social significa que las personas evitan el contacto físico cercano para que el virus no se propague de russ persona a otra. Mantenga al menos 6 pies (2 metros) de distancia entre usted y los demás. También mantenga esta distancia de cualquiera que venga a altman casa, robbie alguien que sruthi russ entrega.  •Acostúmbrese a no tocarse la zoie.No se sabe cuánto tiempo puede permanecer el virus en los objetos y las superficies. Si tiene el virus en las mela, puede transferirlo a los ojos, la nariz o la boca e infectarse. También puede transferirlo a los objetos, las superficies o las personas. Tenga cuidado con lo que toca cuando sale. Por ejemplo, los pasamanos y botones de ascensor. Intente no tocar nada con las mela descubiertas a menos que sea necesario. Lávese las mela antes de salir de altman casa y cuando regresa.  •Limpie y desinfecte a menudo los objetos y las superficies de alto contacto.Use russ solución o toallitas desinfectantes. Puede hacer russ solución diluyendo 4 cucharaditas de lejía en 1 cuarto de galón (4 tazas) de agua. Limpie y desinfecte aunque piense que nadie que viva o haya entrado en altman casa esté infectado con el virus. Puede limpiar los objetos con un paño desinfectante antes de llevarlos a altman casa. Lávese las mela después de manipular cualquier cosa que traiga a altman casa.  •Sruthi que altman sistema inmunitario esté lo más saludable posible.Un sistema inmunitario debilitado lo hace más vulnerable al nuevo coronavirus. No hay ninguna vacuna contra la COVID-19 disponible todavía. Las vacunas, robbie la vacuna contra la gripe y la neumonía, pueden ayudar al sistema inmunitario. Altman médico le indicará qué vacunas debe recibir y cuándo aplicárselas. Mantenga altman sistema inmunitario lo más lubna posible. No fume. Consuma alimentos saludables, sruthi ejercicio regularmente e intente controlar el estrés. Acuéstese y levántese a la misma hora todos los días.   Alimentos saludables  ¿Cómo sigo las pautas de distanciamiento social para ayudar a reducir el riesgo de COVID-19?Las normas de distanciamiento social nacionales y locales varían. Las reglas pueden cambiar con el tiempo a medida que se levantan las restricciones. Las restricciones pueden volver a aplicarse si se produce un brote en el lugar donde usted vive. Es importante conocer y seguir todas las reglas de distanciamiento social actuales en altman área. Las siguientes son reglas generales al respecto:  •Limite los viajes fuera de altman casa.Es posible que se le entreguen alimentos, medicinas y otros suministros. Si es posible, sruthi que dejen los objetos que le entregan en altman macy o en otra área. Intente que nadie le entregue un objeto en mano. Estará tan cerca de la persona que el virus puede propagarse entre ustedes.  •No tenga contacto físico cercano con nadie que no viva en altman casa.No le dé la mano, abrace o bese a russ persona robbie saludo. Párese o camine lo más lejos posible de los demás. Si tiene que usar el transporte público (robbie el autobús o el metro), intente sentarse o pararse lejos de los demás. Puede mantenerse conectado de forma martinez con los demás a través de llamadas telefónicas, mensajes de correo electrónico, sitios web de medios sociales y videochats. Verifique cómo están las personas que pueden tener dificultades para distanciarse socialmente, o que viven solas. Pregunte a los administradores de los asilos de ancianos o de las instalaciones de cuidados a jairo plazo cómo puede comunicarse con seguridad con alguien que vive allí.  •Use un tapabocas de frankie cuando esté cerca de otras personas que no viven en altman casa.Los tapabocas ayudan evitar que el virus se propague a otras personas en las gotitas. Puede usar un tapabocas transparente si alguien necesita leer heidi labios. Karena es un tapabocas con un plástico sobre el área de la boca para que se puedan francie los labios. No utilice tapabocas que tengan válvulas de respiración o respiraderos. El virus puede salir por la válvula o el respiradero y contagiar a otros. No se quite el tapabocas para hablar, toser o estornudar. No utilice tapabocas en niños menores de 2 años ni en personas que tengan problemas respiratorios o no puedan quitárselo  •Permita que solo los profesionales médicos u otros profesionales ingresen a altman casa.Use el tapabocas y recuérdeles a los profesionales que usen un tapabocas. Recuérdeles que se laven las mela cuando lleguen y antes de irse. No deje entrar a nadie que no viva en altman casa, aunque no esté enfermo. Russ persona puede contagiar el virus a otros antes de que comiencen los síntomas de COVID-19. Algunas personas ni siquiera desarrollan síntomas. Los niños suelen tener síntomas leves o ningún síntoma. Puede ser difícil decirle a un ena que no lo abrace ni lo bese. Explíquele que así es robbie puede ayudarlo a mantenerse saludable.  •No vaya a la casa de otra persona, a menos que sea necesario.No vaya de visita, aunque la persona esté [place]. Vaya solo si necesita ayudarla. Asegúrese de que ambos usen un tapabocas mientras esté allí.  •Evite las grandes reuniones y las multitudes.Las reuniones o multitudes de 10 o más individuos pueden hacer que el virus se propague. Por ejemplo, las reuniones incluyen fiestas, eventos deportivos, servicios religiosos y conferencias. Se pueden formar multitudes en las playas, los parques y las atracciones turísticas. Protéjase manteniéndose alejado de las grandes reuniones y multitudes.  •Pregunte a altman médico de qué otra forma puede tener las citas.Es posible que pueda tener citas sin tener que ir al consultorio del médico. Algunos médicos ofrecen citas por teléfono, video u otros tipos de citas. También puede obtener recetas de heidi medicamentos para varios meses de russ vez.  •Manténgase a richar si debe que salir a trabajar.Es posible que tenga un trabajo que solo se puede hacer fuera de altman casa. Mantenga la distancia física entre usted y los demás trabajadores tanto robbie sea posible. Siga las reglas de altman empleador para que todos estén a richar.  ¿Qué noreen hacer si tengo COVID-19 y me estoy recuperando en casa?Los médicos le darán instrucciones específicas que debe seguir. Las siguientes son pautas generales para recordarle cómo mantener a los demás a richar hasta que usted esté milli:   •Lávese las mela frecuentemente.Use agua y jabón tanto robbie sea posible. Puede usar un desinfectante para mela que contenga alcohol, si no hay agua y jabón disponibles. No comparta toallas con nadie. Si usa toallas de papel, deséchelas en un cubo de basura recubierto que se guarda en altman habitación o área. Use un cubo de basura cubierto, si es posible.  •No salga de altman casa a menos que sea necesario.Es posible que tenga que ir al consultorio de altman médico para hacerse chequeos o para resurtir russ receta. No llegue al consultorio del médico sin russ analia. Los médicos tienen que hacer que heidi consultorios florentin seguros para el personal y otros pacientes.  •No entre en contacto físico cercano con nadie, richar que sea necesario.Solo tenga un contacto físico cercano con russ persona que lo cuide directamente, o con un bebé o ena que deba cuidar. Los miembros de la colleen y los amigos no deben visitarlo. Si es posible, quédese en un área o habitación separada de altman casa si vive con otras personas. Nadie debe entrar en el área o en la habitación excepto para brindarle cuidados. Puede visitar a los demás por teléfono, videochat, correo electrónico o sistemas similares. Es importante mantenerse conectado con los demás en altman mike mientras se recupera.  •Use un tapabocas mientras haya otras personas cerca de usted.[place] puede ayudar a evitar que las gotitas propaguen el virus cuando usted habla, estornuda o tose. Póngase el tapabocas antes de que la persona entre en altman habitación o área. Recuérdele a la persona que se cubra la nariz y la boca antes de entrar a brindarle cuidados.  •No comparta artículos.No comparta platos, toallas ni otros artículos con nadie. Los artículos deben ser lavados después de usarlos.  •Proteja a altman bebé.Lávese las mela con agua y jabón con frecuencia alicia todo el día. Use un tapabocas mientras amamanta o mientras se extrae o se saca la leche materna. Si es posible, pídale a alguien que esté milli que cuide de altman bebé. Puede poner la leche materna en biberones para que la persona la use, si es necesario. Hable con altman médico si tiene preguntas o inquietudes acerca de cómo cuidar o vincularse con altman bebé. También le dirá cuándo debe traer a altman bebé para los chequeos y las vacunas. También le dirá qué hacer si rosa que altman bebé está infectado con el nuevo virus.      •No manipule animales vivos.Hasta que se sepa más, es mejor no tocar, jugar o manipular animales vivos. Algunos animales, incluyendo las mascotas, miranda sido infectados con el nuevo coronavirus. No manipule ni cuide animales hasta que esté milli. El cuidado incluye alimentar, acariciar y abrazar a altman mascota. No deje que altman mascota lo lama o comparta altman comida. Pídale a alguien que no esté infectado que cuide de altman mascota, si es posible. Si debe cuidar a russ mascota, usa un tapabocas. Lávese las mela antes y después de cuidar a altman mascota.  •Siga las instrucciones de altman médico para estar cerca de los demás después de recuperarse.Deberá esperar al menos 10 días después de la aparición de los síntomas. Entonces deberá pasar 24 horas sin fiebre sin recibir medicamentos para la fiebre, y sin otros síntomas. La pérdida del sentido del gusto o el olfato puede continuar alicia varios meses. Se considera que está milli estar cerca de otros si karena es el único síntoma. No se sabe con certeza si russ persona recuperada puede transmitir el virus a otros, ni por cuánto tiempo. Altman médico puede darle instrucciones, robbie continuar con el distanciamiento social o usar un tapabocas cuando esté cerca de otras personas.  ¿Cómo noreen cuidar a alguien que tiene COVID-19?Si la persona vive en otro hogar, coordine un tiempo para brindar cuidados. Recuerde llevar algunos pares de guantes desechables y un tapabocas. Las siguientes son las pautas generales para ayudarle a cuidar de forma martinez a cualquier persona que tenga COVID-19:  •Lávese las mela frecuentemente.Lávese antes y después de entrar en la casa, área o habitación de la persona. Deseche las toallas de papel en un cubo de basura recubierto que tenga russ tapa, si es posible.  •No permita que otros se acerquen a la persona.Nadie debe ingresar a la casa de la persona a menos que sea necesario. De ser posible, la persona debe estar en un área o habitación separada si vive con otras personas. Mantenga la macy de la habitación cerrada a menos que necesite entrar o salir. Sruthi que otras personas llamen, charlen por video o envíen un correo electrónico a la persona si se siente lo suficientemente milli. La persona puede sentirse [place] si se la mantiene separada alicia un jairo período de tiempo. La comunicación martinez puede ayudar a esta persona a mantenerse en contacto con altman colleen y amigos.  •Asegúrese de que la habitación de la persona tenga un buen flujo de aire.Puede abrir la ventana si el clima lo permite. También se puede encender el aire acondicionado para ayudar a que el aire se mueva.  •Comuníquese con la persona antes de entrar para brindarle cuidados.Asegúrese de que la persona use un tapabocas. Recuérdele que se lave las mela con agua y jabón. Puede usar un desinfectante para mela que contenga alcohol, si no hay agua y jabón disponibles. Colóquese el tapabocas antes de entrar al lugar a brindar cuidados.  •Use guantes mientras mellisa cuidados y limpia.Limpie los objetos que la persona usa a menudo. Limpie las encimeras, las superficies de cocción y los frentes y el interior del microondas y el refrigerador. Limpie la ducha, el sanitario, el área alrededor del sanitario, el lavabo, el área alrededor del lavabo y los grifos. Junte la ropa sucia o la ropa de cama. Lave y seque los artículos con el agua más caliente que permita la frankie. Lave los platos y utensilios usados en [place] y jabonosa o en un lavavajillas.  •Todo lo que deseche debe ir a un cubo de basura recubierto.Cuando necesite vaciar la basura, cierre el extremo abierto de la cubierta y átela. [place] ayuda a evitar que los artículos en los que está el virus se salgan de la basura. Quítese los guantes y deséchelos. Lávese las mela.      ¿Dónde puedo obtener más información?  •Centers for Disease Control and Prevention  [address] [place] [address]  Phone: [phone]  Web Address: http://www.cdc.gov      ¿Qué noreen hacer si pienso que yo o alguien que conozco está infectado?Sruthi lo siguiente para proteger a otras personas:   •Si se requiere atención de emergencia,avise al operador de la posible infección, o llame antes y avise al servicio de urgencias.  •Llame a un médicopara recibir instrucciones si los síntomas son leves. Cualquier persona que pueda estar infectada no debe llegar sin llamar ni. El médico deberá proteger a los miembros del personal y a otros pacientes.  •La persona que puede estar infectada debe usar un tapabocasmientras reciben atención médica. [place] ayudará a reducir el riesgo de infectar a otras personas. Nadie que sea cari de 2 años, que tenga problemas respiratorios o que no pueda quitárselo debe usar un tapabocas. El médico puede darle instrucciones para cualquier persona que no pueda usar un tapabocas.      Llame al número local de emergencias (911 en los [place]) o al departamento de emergencias si:  •Usted tiene dificultad para respirar o falta de aliento mientras descansa.  •Usted siente presión o dolor en el pecho que dura más de 5 minutos.  •Usted tiene confusión o es difícil despertarlo.  •Heidi labios o zoie están azules.  •Usted tiene fiebre de 104 ºF (40 °C) o más.  ¿Cuándo noreen llamar a mi médico?  •No tiene síntomas de COVID-19 fabiana tuvo contacto físico cercano dentro de los 14 días con alguien que noah positivo.  •Usted tiene preguntas o inquietudes acerca de altman condición o cuidado.      ACUERDOS SOBRE ALTMAN CUIDADO:  Usted tiene el derecho de ayudar a planear altman cuidado. Aprenda todo lo que pueda sobre altman condición y robbie darle tratamiento. Discuta heidi opciones de tratamiento con heidi médicos para decidir el cuidado que usted desea recibir. Usted siempre tiene el derecho de rechazar el tratamiento.

## 2021-12-22 NOTE — ED PROVIDER NOTE - PATIENT PORTAL LINK FT
You can access the FollowMyHealth Patient Portal offered by [facility] by registering at the following website: [email or web address]. By joining [facility]’s FollowMyHealth portal, you will also be able to view your health information using other applications (apps) compatible with our system.

## 2024-02-06 ENCOUNTER — EMERGENCY (EMERGENCY)
Facility: HOSPITAL | Age: 49
LOS: 1 days | Discharge: HOME | End: 2024-02-06
Attending: EMERGENCY MEDICINE | Admitting: EMERGENCY MEDICINE
Payer: MEDICAID

## 2024-02-06 VITALS
HEART RATE: 94 BPM | DIASTOLIC BLOOD PRESSURE: 129 MMHG | OXYGEN SATURATION: 97 % | RESPIRATION RATE: 18 BRPM | SYSTOLIC BLOOD PRESSURE: 184 MMHG | TEMPERATURE: 99 F

## 2024-02-06 PROCEDURE — 93010 ELECTROCARDIOGRAM REPORT: CPT

## 2024-02-06 PROCEDURE — 99285 EMERGENCY DEPT VISIT HI MDM: CPT

## 2024-02-06 PROCEDURE — 71046 X-RAY EXAM CHEST 2 VIEWS: CPT | Mod: 26

## 2024-02-06 RX ORDER — CAPTOPRIL 12.5 MG/1
50 TABLET ORAL ONCE
Refills: 0 | Status: COMPLETED | OUTPATIENT
Start: 2024-02-06 | End: 2024-02-06

## 2024-02-06 NOTE — ED PROVIDER NOTE - CLINICAL SUMMARY MEDICAL DECISION MAKING FREE TEXT BOX
48F with hx of HTN presents today with HTN Emergency given BP > 180 and blurry vision. Patient with suspected hx of HTN given prior vitals from prior ED visits, will give Captopril 50mg for blood pressure control and will order blood work/trops to check for end organ damage. Vital signs significant for HTN, but otherwise stable. Physical exam benign at this time. 48F with hx of HTN presents today with HTN urgency along with vision blurriness for 1 week. It is unlikely if patient's vision is related to BP at this time. Vital signs significant for HTN, but otherwise stable. Physical exam benign at this time. Will give Captopril 50mg for blood pressure control and will order blood work/trops to check for end organ damage.

## 2024-02-06 NOTE — ED PROVIDER NOTE - PHYSICAL EXAMINATION
T(C): 36.7 (02-06-24 @ 23:01), Max: 37.2 (02-06-24 @ 20:07)  HR: 74 (02-06-24 @ 23:01) (72 - 94)  BP: 185/84 (02-06-24 @ 23:01) (184/129 - 189/114)  RR: 18 (02-06-24 @ 23:01) (18 - 18)  SpO2: 100% (02-06-24 @ 23:01) (97% - 100%)    CONSTITUTIONAL: Well groomed, no apparent distress  EYES: PERRLA and symmetric, EOMI, peripheral vision intact  ENMT: Oral mucosa with moist membranes. Normal dentition; no pharyngeal injection or exudates  RESP: No respiratory distress, no use of accessory muscles; CTA b/l, no WRR  CV: RRR, +S1S2, no MRG; no JVD; no peripheral edema  GI: Soft, NT, ND, no rebound, no guarding; no palpable masses; no hepatosplenomegaly; no hernia palpated  MSK: 5/5 UE and LE Strength  SKIN: No rashes or ulcers noted; no subcutaneous nodules or induration palpable  NEURO: CN II-XII intact; normal reflexes in upper and lower extremities, sensation intact in upper and lower extremities b/l to light touch   PSYCH: Appropriate insight/judgment; A+O x 3, mood and affect appropriate, recent/remote memory intact

## 2024-02-06 NOTE — ED PROVIDER NOTE - PATIENT PORTAL LINK FT
You can access the FollowMyHealth Patient Portal offered by [facility] by registering at the following website: [email or web address]. By joining [employer]’s FollowMyHealth portal, you will also be able to view your health information using other applications (apps) compatible with our system.

## 2024-02-06 NOTE — ED PROVIDER NOTE - NSICDXNOPASTSURGICALHX_GEN_ALL_ED
<-- Click to add NO significant Past Surgical History Call 911 for stroke/Need for follow up after discharge/Prescribed medications/Risk factors for stroke/Stroke education booklet/Stroke support groups for patients, families, and friends/Stroke warning signs and symptoms/Signs and symptoms of stroke

## 2024-02-06 NOTE — ED PROVIDER NOTE - NSFOLLOWUPINSTRUCTIONS_ED_ALL_ED_FT
You were found to have severely elevated Blood pressure and blurry vision for the past week. We were concerned so we evaluated you with blood work and with imaging of your head and chest. We did not find any signs concerning for stroke or any labs or EKG concerning for a heart attack.     Your Blood Pressure is elevated and you will likely need medications in the outpatient setting. Your labs were also concerning for possible kidney damage with a decreased GFR to 59. This may be temporary and it is one measurement so it will require followup labs.    Please follow up with your PCP for further evaluation of your blood pressure medication and your kidney function.    Please follow up with an Ophthalmologist for further evaluation of your blurry vision. We also recommend at least yearly or bi-yearly eye exams given your high blood pressure. You were found to have severely elevated Blood pressure and blurry vision for the past week. We were concerned so we evaluated you with blood work and with imaging of your head and chest. We did not find any signs concerning for stroke or any labs or EKG concerning for a heart attack.     Your Blood Pressure is elevated and you will likely need medications in the outpatient setting. Your labs were also concerning for possible kidney damage with a decreased GFR to 59 and elevated protein in the urine. This may be temporary and it is one measurement so it will require followup labs.    Please follow up with your PCP for further evaluation of your blood pressure medication and your kidney function.    Blood pressure medications that may offer benefit include ACE inhibitors or Aldosterone Receptor blockers such as: Lisinopril, Losartan, Valsartan, Telmisartan, Olmesartan  Diet and Exercise will also help lower your blood pressure: Helpful diets include the mediterranean diet, the DASH diet, and a Whole Food Plant Based Diet    Please follow up with an Ophthalmologist for further evaluation of your blurry vision. We also recommend at least yearly or bi-yearly eye exams given your high blood pressure.

## 2024-02-06 NOTE — ED PROVIDER NOTE - OBJECTIVE STATEMENT
48F with hx of HTN presents today with hypertension. Patient reports she checked her blood pressure at home yesterday and noted elevated pressures of 160/110. She was concerned and went to the Urgent care where her SBP was 199 and she was sent to the ED. Patient denies any history of hypertension and does not take any medications at home. She reports some stress with her job, but not abnormal stress.      Patient denies any chest pain, palpitations, dyspnea, N/V/D, Changes in mental status.

## 2024-02-06 NOTE — ED PROVIDER NOTE - NS ED ROS FT
REVIEW OF SYSTEMS:  CONSTITUTIONAL: No weakness, fevers or chills  EYES/ENT: Blurry Near Vision, normal far vision  NECK: No pain or stiffness  RESPIRATORY: No cough, wheezing, hemoptysis; No shortness of breath  CARDIOVASCULAR: No chest pain or palpitations  GASTROINTESTINAL: No abdominal or epigastric pain. No nausea, vomiting, or hematemesis; No diarrhea or constipation. No melena or hematochezia.  GENITOURINARY: No dysuria, frequency or hematuria  NEUROLOGICAL: No numbness or weakness  SKIN: No itching, rashes

## 2024-02-06 NOTE — ED PROVIDER NOTE - PROGRESS NOTE DETAILS
Patient's BP improved after Captopril 50mg x1, still denies any significant chest pain, has no AMS, no neurologic deficits.    Patient is stable for DC to home pending results of U/A

## 2024-02-06 NOTE — ED PROVIDER NOTE - ATTENDING CONTRIBUTION TO CARE
PTED with concerns of HTN and blurry vision normal exam no papilledema no lateralizing signs  will screen for end organ damage if negative will d/c with pcp followup and and opthalmology (for long term f/u of blurry vision   RTED PRN

## 2024-02-06 NOTE — ED ADULT TRIAGE NOTE - CHIEF COMPLAINT QUOTE
Pt ambulatory c/o elevated BP found at Urgent Care yesterday. Pt endorsing blurry vision x 1 week. Denies headache, chest pain SOB. No slurred speech. Denies any other known medical Hx.

## 2024-02-07 VITALS
RESPIRATION RATE: 18 BRPM | SYSTOLIC BLOOD PRESSURE: 161 MMHG | DIASTOLIC BLOOD PRESSURE: 87 MMHG | TEMPERATURE: 98 F | HEART RATE: 72 BPM | OXYGEN SATURATION: 100 %

## 2024-02-07 LAB
ALBUMIN SERPL ELPH-MCNC: 3.5 G/DL — SIGNIFICANT CHANGE UP (ref 3.3–5)
ALP SERPL-CCNC: 48 U/L — SIGNIFICANT CHANGE UP (ref 40–120)
ALT FLD-CCNC: 13 U/L — SIGNIFICANT CHANGE UP (ref 4–33)
ANION GAP SERPL CALC-SCNC: 10 MMOL/L — SIGNIFICANT CHANGE UP (ref 7–14)
APPEARANCE UR: CLEAR — SIGNIFICANT CHANGE UP
AST SERPL-CCNC: 22 U/L — SIGNIFICANT CHANGE UP (ref 4–32)
BACTERIA # UR AUTO: ABNORMAL /HPF
BASOPHILS # BLD AUTO: 0.01 K/UL — SIGNIFICANT CHANGE UP (ref 0–0.2)
BASOPHILS NFR BLD AUTO: 0.2 % — SIGNIFICANT CHANGE UP (ref 0–2)
BILIRUB SERPL-MCNC: 0.2 MG/DL — SIGNIFICANT CHANGE UP (ref 0.2–1.2)
BILIRUB UR-MCNC: NEGATIVE — SIGNIFICANT CHANGE UP
BUN SERPL-MCNC: 13 MG/DL — SIGNIFICANT CHANGE UP (ref 7–23)
CALCIUM SERPL-MCNC: 8.6 MG/DL — SIGNIFICANT CHANGE UP (ref 8.4–10.5)
CAST: 1 /LPF — SIGNIFICANT CHANGE UP (ref 0–4)
CHLORIDE SERPL-SCNC: 106 MMOL/L — SIGNIFICANT CHANGE UP (ref 98–107)
CO2 SERPL-SCNC: 22 MMOL/L — SIGNIFICANT CHANGE UP (ref 22–31)
COLOR SPEC: YELLOW — SIGNIFICANT CHANGE UP
CREAT SERPL-MCNC: 1.16 MG/DL — SIGNIFICANT CHANGE UP (ref 0.5–1.3)
DIFF PNL FLD: ABNORMAL
EGFR: 59 ML/MIN/1.73M2 — LOW
EOSINOPHIL # BLD AUTO: 0.1 K/UL — SIGNIFICANT CHANGE UP (ref 0–0.5)
EOSINOPHIL NFR BLD AUTO: 1.8 % — SIGNIFICANT CHANGE UP (ref 0–6)
GLUCOSE SERPL-MCNC: 92 MG/DL — SIGNIFICANT CHANGE UP (ref 70–99)
GLUCOSE UR QL: NEGATIVE MG/DL — SIGNIFICANT CHANGE UP
HCG UR QL: NEGATIVE — SIGNIFICANT CHANGE UP
HCT VFR BLD CALC: 37.7 % — SIGNIFICANT CHANGE UP (ref 34.5–45)
HGB BLD-MCNC: 12.3 G/DL — SIGNIFICANT CHANGE UP (ref 11.5–15.5)
IANC: 2.95 K/UL — SIGNIFICANT CHANGE UP (ref 1.8–7.4)
IMM GRANULOCYTES NFR BLD AUTO: 0.2 % — SIGNIFICANT CHANGE UP (ref 0–0.9)
KETONES UR-MCNC: NEGATIVE MG/DL — SIGNIFICANT CHANGE UP
LEUKOCYTE ESTERASE UR-ACNC: NEGATIVE — SIGNIFICANT CHANGE UP
LYMPHOCYTES # BLD AUTO: 1.66 K/UL — SIGNIFICANT CHANGE UP (ref 1–3.3)
LYMPHOCYTES # BLD AUTO: 29.1 % — SIGNIFICANT CHANGE UP (ref 13–44)
MCHC RBC-ENTMCNC: 28.3 PG — SIGNIFICANT CHANGE UP (ref 27–34)
MCHC RBC-ENTMCNC: 32.6 GM/DL — SIGNIFICANT CHANGE UP (ref 32–36)
MCV RBC AUTO: 86.9 FL — SIGNIFICANT CHANGE UP (ref 80–100)
MONOCYTES # BLD AUTO: 0.97 K/UL — HIGH (ref 0–0.9)
MONOCYTES NFR BLD AUTO: 17 % — HIGH (ref 2–14)
NEUTROPHILS # BLD AUTO: 2.95 K/UL — SIGNIFICANT CHANGE UP (ref 1.8–7.4)
NEUTROPHILS NFR BLD AUTO: 51.7 % — SIGNIFICANT CHANGE UP (ref 43–77)
NITRITE UR-MCNC: NEGATIVE — SIGNIFICANT CHANGE UP
NRBC # BLD: 0 /100 WBCS — SIGNIFICANT CHANGE UP (ref 0–0)
NRBC # FLD: 0 K/UL — SIGNIFICANT CHANGE UP (ref 0–0)
PH UR: 7.5 — SIGNIFICANT CHANGE UP (ref 5–8)
PLATELET # BLD AUTO: 215 K/UL — SIGNIFICANT CHANGE UP (ref 150–400)
POTASSIUM SERPL-MCNC: 4.1 MMOL/L — SIGNIFICANT CHANGE UP (ref 3.5–5.3)
POTASSIUM SERPL-SCNC: 4.1 MMOL/L — SIGNIFICANT CHANGE UP (ref 3.5–5.3)
PROT SERPL-MCNC: 7.3 G/DL — SIGNIFICANT CHANGE UP (ref 6–8.3)
PROT UR-MCNC: 100 MG/DL
RBC # BLD: 4.34 M/UL — SIGNIFICANT CHANGE UP (ref 3.8–5.2)
RBC # FLD: 14.9 % — HIGH (ref 10.3–14.5)
RBC CASTS # UR COMP ASSIST: 0 /HPF — SIGNIFICANT CHANGE UP (ref 0–4)
SODIUM SERPL-SCNC: 138 MMOL/L — SIGNIFICANT CHANGE UP (ref 135–145)
SP GR SPEC: 1.01 — SIGNIFICANT CHANGE UP (ref 1–1.03)
SQUAMOUS # UR AUTO: 8 /HPF — HIGH (ref 0–5)
TROPONIN T, HIGH SENSITIVITY RESULT: 10 NG/L — SIGNIFICANT CHANGE UP
UROBILINOGEN FLD QL: 0.2 MG/DL — SIGNIFICANT CHANGE UP (ref 0.2–1)
WBC # BLD: 5.7 K/UL — SIGNIFICANT CHANGE UP (ref 3.8–10.5)
WBC # FLD AUTO: 5.7 K/UL — SIGNIFICANT CHANGE UP (ref 3.8–10.5)
WBC UR QL: 2 /HPF — SIGNIFICANT CHANGE UP (ref 0–5)

## 2024-02-07 PROCEDURE — 70450 CT HEAD/BRAIN W/O DYE: CPT | Mod: 26,MA

## 2024-02-07 RX ADMIN — CAPTOPRIL 50 MILLIGRAM(S): 12.5 TABLET ORAL at 00:41

## 2024-02-07 NOTE — ED ADULT NURSE NOTE - OBJECTIVE STATEMENT
Pt received in room 23A, aaox4, ambulatory, breathing even and unlabored in bed. Pt states that she was taking her BP at home when she noticed it was very high, pt has never had high BP before and is not on any medications. Pt denies chest pain, SOB, dizziness, headache, blurry vision, chills. Bed in lowest position, call bell within reach. #20G IV placed in the left AC, labs drawn and sent.

## 2024-02-07 NOTE — ED ADULT NURSE NOTE - ALCOHOL PRE SCREEN (AUDIT - C)
Statement Selected Minoxidil Pregnancy And Lactation Text: This medication has not been assigned a Pregnancy Risk Category but animal studies failed to show danger with the topical medication. It is unknown if the medication is excreted in breast milk.

## 2024-02-07 NOTE — ED ADULT NURSE NOTE - NSFALLUNIVINTERV_ED_ALL_ED
Bed/Stretcher in lowest position, wheels locked, appropriate side rails in place/Call bell, personal items and telephone in reach/Instruct patient to call for assistance before getting out of bed/chair/stretcher/Non-slip footwear applied when patient is off stretcher/[place] to call system/Physically safe environment - no spills, clutter or unnecessary equipment/Purposeful proactive rounding/Room/bathroom lighting operational, light cord in reach

## 2025-01-06 ENCOUNTER — APPOINTMENT (OUTPATIENT)
Dept: OBGYN | Facility: CLINIC | Age: 50
End: 2025-01-06
Payer: MEDICAID

## 2025-01-06 VITALS
BODY MASS INDEX: 40.65 KG/M2 | DIASTOLIC BLOOD PRESSURE: 97 MMHG | HEART RATE: 82 BPM | HEIGHT: 65 IN | WEIGHT: 244 LBS | SYSTOLIC BLOOD PRESSURE: 142 MMHG

## 2025-01-06 DIAGNOSIS — Z01.419 ENCOUNTER FOR GYNECOLOGICAL EXAMINATION (GENERAL) (ROUTINE) W/OUT ABNORMAL FINDINGS: ICD-10-CM

## 2025-01-06 PROBLEM — Z00.00 ENCOUNTER FOR PREVENTIVE HEALTH EXAMINATION: Status: ACTIVE | Noted: 2025-01-06

## 2025-01-06 PROCEDURE — 99386 PREV VISIT NEW AGE 40-64: CPT

## 2025-01-06 PROCEDURE — 99459 PELVIC EXAMINATION: CPT

## 2025-01-08 LAB — HPV HIGH+LOW RISK DNA PNL CVX: NOT DETECTED

## 2025-01-09 LAB — CYTOLOGY CVX/VAG DOC THIN PREP: ABNORMAL

## 2025-02-13 ENCOUNTER — APPOINTMENT (OUTPATIENT)
Dept: OBGYN | Facility: CLINIC | Age: 50
End: 2025-02-13

## 2025-02-19 ENCOUNTER — APPOINTMENT (OUTPATIENT)
Dept: OBGYN | Facility: CLINIC | Age: 50
End: 2025-02-19

## 2025-02-19 ENCOUNTER — APPOINTMENT (OUTPATIENT)
Dept: OBGYN | Facility: CLINIC | Age: 50
End: 2025-02-19
Payer: MEDICAID

## 2025-02-19 ENCOUNTER — ASOB RESULT (OUTPATIENT)
Age: 50
End: 2025-02-19

## 2025-02-19 VITALS
SYSTOLIC BLOOD PRESSURE: 160 MMHG | DIASTOLIC BLOOD PRESSURE: 104 MMHG | HEART RATE: 86 BPM | BODY MASS INDEX: 40.32 KG/M2 | WEIGHT: 242 LBS | HEIGHT: 65 IN

## 2025-02-19 DIAGNOSIS — D25.9 LEIOMYOMA OF UTERUS, UNSPECIFIED: ICD-10-CM

## 2025-02-19 PROCEDURE — XXXXX: CPT | Mod: 1L

## 2025-02-19 PROCEDURE — 99214 OFFICE O/P EST MOD 30 MIN: CPT

## 2025-04-20 ENCOUNTER — EMERGENCY (EMERGENCY)
Facility: HOSPITAL | Age: 50
LOS: 1 days | End: 2025-04-20
Attending: STUDENT IN AN ORGANIZED HEALTH CARE EDUCATION/TRAINING PROGRAM | Admitting: STUDENT IN AN ORGANIZED HEALTH CARE EDUCATION/TRAINING PROGRAM
Payer: MEDICAID

## 2025-04-20 VITALS
HEART RATE: 77 BPM | RESPIRATION RATE: 16 BRPM | SYSTOLIC BLOOD PRESSURE: 168 MMHG | HEIGHT: 65 IN | DIASTOLIC BLOOD PRESSURE: 98 MMHG | WEIGHT: 242.95 LBS | TEMPERATURE: 98 F | OXYGEN SATURATION: 95 %

## 2025-04-20 VITALS
RESPIRATION RATE: 15 BRPM | DIASTOLIC BLOOD PRESSURE: 84 MMHG | HEART RATE: 76 BPM | TEMPERATURE: 98 F | SYSTOLIC BLOOD PRESSURE: 158 MMHG | OXYGEN SATURATION: 97 %

## 2025-04-20 LAB
ALBUMIN SERPL ELPH-MCNC: 3.5 G/DL — SIGNIFICANT CHANGE UP (ref 3.3–5)
ALP SERPL-CCNC: 53 U/L — SIGNIFICANT CHANGE UP (ref 40–120)
ALT FLD-CCNC: 12 U/L — SIGNIFICANT CHANGE UP (ref 4–33)
ANION GAP SERPL CALC-SCNC: 8 MMOL/L — SIGNIFICANT CHANGE UP (ref 7–14)
APPEARANCE UR: CLEAR — SIGNIFICANT CHANGE UP
AST SERPL-CCNC: 17 U/L — SIGNIFICANT CHANGE UP (ref 4–32)
BASOPHILS # BLD AUTO: 0.01 K/UL — SIGNIFICANT CHANGE UP (ref 0–0.2)
BASOPHILS NFR BLD AUTO: 0.2 % — SIGNIFICANT CHANGE UP (ref 0–2)
BILIRUB SERPL-MCNC: <0.2 MG/DL — SIGNIFICANT CHANGE UP (ref 0.2–1.2)
BILIRUB UR-MCNC: NEGATIVE — SIGNIFICANT CHANGE UP
BLOOD GAS VENOUS COMPREHENSIVE RESULT: SIGNIFICANT CHANGE UP
BUN SERPL-MCNC: 13 MG/DL — SIGNIFICANT CHANGE UP (ref 7–23)
CALCIUM SERPL-MCNC: 8.5 MG/DL — SIGNIFICANT CHANGE UP (ref 8.4–10.5)
CHLORIDE SERPL-SCNC: 104 MMOL/L — SIGNIFICANT CHANGE UP (ref 98–107)
CO2 SERPL-SCNC: 27 MMOL/L — SIGNIFICANT CHANGE UP (ref 22–31)
COLOR SPEC: YELLOW — SIGNIFICANT CHANGE UP
CREAT SERPL-MCNC: 1.13 MG/DL — SIGNIFICANT CHANGE UP (ref 0.5–1.3)
DIFF PNL FLD: NEGATIVE — SIGNIFICANT CHANGE UP
EGFR: 60 ML/MIN/1.73M2 — SIGNIFICANT CHANGE UP
EGFR: 60 ML/MIN/1.73M2 — SIGNIFICANT CHANGE UP
EOSINOPHIL # BLD AUTO: 0.11 K/UL — SIGNIFICANT CHANGE UP (ref 0–0.5)
EOSINOPHIL NFR BLD AUTO: 2.3 % — SIGNIFICANT CHANGE UP (ref 0–6)
GLUCOSE SERPL-MCNC: 94 MG/DL — SIGNIFICANT CHANGE UP (ref 70–99)
GLUCOSE UR QL: NEGATIVE MG/DL — SIGNIFICANT CHANGE UP
HCT VFR BLD CALC: 37.5 % — SIGNIFICANT CHANGE UP (ref 34.5–45)
HGB BLD-MCNC: 12.2 G/DL — SIGNIFICANT CHANGE UP (ref 11.5–15.5)
IANC: 2.62 K/UL — SIGNIFICANT CHANGE UP (ref 1.8–7.4)
IMM GRANULOCYTES NFR BLD AUTO: 0 % — SIGNIFICANT CHANGE UP (ref 0–0.9)
KETONES UR-MCNC: NEGATIVE MG/DL — SIGNIFICANT CHANGE UP
LEUKOCYTE ESTERASE UR-ACNC: NEGATIVE — SIGNIFICANT CHANGE UP
LIDOCAIN IGE QN: 69 U/L — HIGH (ref 7–60)
LYMPHOCYTES # BLD AUTO: 1.27 K/UL — SIGNIFICANT CHANGE UP (ref 1–3.3)
LYMPHOCYTES # BLD AUTO: 26.3 % — SIGNIFICANT CHANGE UP (ref 13–44)
MCHC RBC-ENTMCNC: 28.9 PG — SIGNIFICANT CHANGE UP (ref 27–34)
MCHC RBC-ENTMCNC: 32.5 G/DL — SIGNIFICANT CHANGE UP (ref 32–36)
MCV RBC AUTO: 88.9 FL — SIGNIFICANT CHANGE UP (ref 80–100)
MONOCYTES # BLD AUTO: 0.81 K/UL — SIGNIFICANT CHANGE UP (ref 0–0.9)
MONOCYTES NFR BLD AUTO: 16.8 % — HIGH (ref 2–14)
NEUTROPHILS # BLD AUTO: 2.62 K/UL — SIGNIFICANT CHANGE UP (ref 1.8–7.4)
NEUTROPHILS NFR BLD AUTO: 54.4 % — SIGNIFICANT CHANGE UP (ref 43–77)
NITRITE UR-MCNC: NEGATIVE — SIGNIFICANT CHANGE UP
NRBC # BLD AUTO: 0 K/UL — SIGNIFICANT CHANGE UP (ref 0–0)
NRBC # FLD: 0 K/UL — SIGNIFICANT CHANGE UP (ref 0–0)
NRBC BLD AUTO-RTO: 0 /100 WBCS — SIGNIFICANT CHANGE UP (ref 0–0)
PH UR: 8.5 (ref 5–8)
PLATELET # BLD AUTO: 191 K/UL — SIGNIFICANT CHANGE UP (ref 150–400)
POTASSIUM SERPL-MCNC: 4.5 MMOL/L — SIGNIFICANT CHANGE UP (ref 3.5–5.3)
POTASSIUM SERPL-SCNC: 4.5 MMOL/L — SIGNIFICANT CHANGE UP (ref 3.5–5.3)
PROT SERPL-MCNC: 7.2 G/DL — SIGNIFICANT CHANGE UP (ref 6–8.3)
PROT UR-MCNC: SIGNIFICANT CHANGE UP MG/DL
RBC # BLD: 4.22 M/UL — SIGNIFICANT CHANGE UP (ref 3.8–5.2)
RBC # FLD: 14 % — SIGNIFICANT CHANGE UP (ref 10.3–14.5)
SODIUM SERPL-SCNC: 139 MMOL/L — SIGNIFICANT CHANGE UP (ref 135–145)
SP GR SPEC: 1.01 — SIGNIFICANT CHANGE UP (ref 1–1.03)
UROBILINOGEN FLD QL: 0.2 MG/DL — SIGNIFICANT CHANGE UP (ref 0.2–1)
WBC # BLD: 4.82 K/UL — SIGNIFICANT CHANGE UP (ref 3.8–10.5)
WBC # FLD AUTO: 4.82 K/UL — SIGNIFICANT CHANGE UP (ref 3.8–10.5)

## 2025-04-20 PROCEDURE — 74176 CT ABD & PELVIS W/O CONTRAST: CPT | Mod: 26

## 2025-04-20 PROCEDURE — 99284 EMERGENCY DEPT VISIT MOD MDM: CPT | Mod: 25

## 2025-04-20 RX ORDER — KETOROLAC TROMETHAMINE 30 MG/ML
15 INJECTION, SOLUTION INTRAMUSCULAR; INTRAVENOUS ONCE
Refills: 0 | Status: DISCONTINUED | OUTPATIENT
Start: 2025-04-20 | End: 2025-04-20

## 2025-04-20 RX ORDER — ACETAMINOPHEN 500 MG/5ML
1000 LIQUID (ML) ORAL ONCE
Refills: 0 | Status: COMPLETED | OUTPATIENT
Start: 2025-04-20 | End: 2025-04-20

## 2025-04-20 RX ORDER — LIDOCAINE HYDROCHLORIDE 20 MG/ML
1 JELLY TOPICAL ONCE
Refills: 0 | Status: COMPLETED | OUTPATIENT
Start: 2025-04-20 | End: 2025-04-20

## 2025-04-20 RX ADMIN — KETOROLAC TROMETHAMINE 15 MILLIGRAM(S): 30 INJECTION, SOLUTION INTRAMUSCULAR; INTRAVENOUS at 07:54

## 2025-04-20 RX ADMIN — Medication 1000 MILLILITER(S): at 06:19

## 2025-04-20 RX ADMIN — Medication 20 MILLIGRAM(S): at 06:19

## 2025-04-20 RX ADMIN — LIDOCAINE HYDROCHLORIDE 1 PATCH: 20 JELLY TOPICAL at 06:19

## 2025-04-20 RX ADMIN — KETOROLAC TROMETHAMINE 15 MILLIGRAM(S): 30 INJECTION, SOLUTION INTRAMUSCULAR; INTRAVENOUS at 06:19

## 2025-04-20 RX ADMIN — Medication 1000 MILLIGRAM(S): at 07:54

## 2025-04-20 RX ADMIN — Medication 400 MILLIGRAM(S): at 06:19

## 2025-04-20 NOTE — ED PROVIDER NOTE - NSFOLLOWUPCLINICS_GEN_ALL_ED_FT
[facility]  OB/GYN & Surg  [address] [place] [id number]  Phone: [phone]  Fax:   Follow Up Time: Urgent    [facility] Gynecology and Obstetrics  Gynceology/OB  [address] [place] [address]  Phone: [phone]  Fax:   Follow Up Time: Urgent

## 2025-04-20 NOTE — ED PROVIDER NOTE - PROGRESS NOTE DETAILS
Yeimi, PGY3: Patient signed out to me by night team.    Domenica - 49yF [1]  no PMHx  2d L flank pain, no n/v, no  symp  [ ] CT A/P     Labs unremarkable and there is no evidence of UTI.  CT abdomen/pelvis shows multiple findings:   - Enlarged lobular uterus with the predominant fundal lesion (12.6 x 21.1 x 15.2 cm)   - Left ovarian cyst (7.5 x 5.5 x 3.0 cm)   - Right renal cyst (lower pole) with peripheral calcification concerning for hemorrhagic cyst   - Large splenic cyst (8.3 x 5.3 cm)   - Moderate umbilical hernia, colonic diverticulosis with no evidence of acute diverticulitis    All findings were discussed with patient and questions were answered.  Discussed that this uterine mass may represent uterine cancer and the patient needs urgent referral for gynecology/oncologist.  Patient states that she has had a history of fibroids and that she has had abdominal/flank pain for weeks.  However, over the past 2-3 days, pain got much worse.  Patient is been taking Gas-X with minimal improvement in symptoms.  She has no history of cancer or family history of cancer.  Her pain is improved since presentation to the ED and patient feels comfortable following up outpatient with acetaminophen/ibuprofen at home.  Low concern for torsion despite ovarian mass at this time as patient has improvement in abdominal/flank pain after receiving acetaminophen/Toradol/famotidine/lidocaine patch.  She denies any nausea/vomiting, changes in bowel movement, and is able to tolerate p.o. intake without any difficulty at home.    Messaged [facility] cancer directed network and will give patient referral for gynecologist oncologist/gynecologist.  Plan for discharge with urgent outpatient follow-up for  cancer evaluation.

## 2025-04-20 NOTE — ED PROVIDER NOTE - CLINICAL SUMMARY MEDICAL DECISION MAKING FREE TEXT BOX
Geovanny Aaron, PGY3 - This is a 49-year-old female with no significant past medical history other than hypertension, no surgical history, no kidney stone history, presenting today for on and off left flank pain now getting worse the past few hours.  Patient states that she feels very gassy but not nauseous not vomiting.  Has been passing gas and having bowel movements.  No nausea or vomiting.  No abdominal pain in the anterior region.  Left flank pain around the kidney area.  No rash noted.  No fever no chills.  No urinary symptoms such as dysuria urinary frequency or hematuria.  Never had this type of pain before.  No trauma or inciting event.  Vital signs here hypertension to 160 over 90s otherwise within normal limits.  Physical exam shows patient in pain not in acute respiratory distress.  No crackles or wheezing.  Left flank tenderness to palpation.  No rash noted.  Abdomen soft and nontender.  No lower extremity pitting edema.  Concern at this time for kidney stone versus pyelonephritis versus musculoskeletal pain.  Will give Ofirmev Toradol Pepcid and lidocaine patch and fluids.  Will do labs VBG and urinalysis.  Will do CT abdomen pelvis Noncon to stone [place].  Disposition pending labs imaging and reassessment.

## 2025-04-20 NOTE — ED ADULT NURSE REASSESSMENT NOTE - NS ED NURSE REASSESS COMMENT FT1
Pt reporting pain is at a comfortable level at this time. Pt denies chest pain, SOB, dizziness, headache, blurry vision, chills. Bed in lowest position, call bell within reach.

## 2025-04-20 NOTE — ED PROVIDER NOTE - NSFOLLOWUPINSTRUCTIONS_ED_ALL_ED_FT
You were seen for left flank pain.  You had blood work and CT scan and urine test done.  You can take ibuprofen 400mg every 6-8 hours and/or Tylenol 1000mg every 6-8 hours as needed for pain.  Please follow-up with a urologist. A representative will call you to make an appointment in 2-3 days.  Please return to emergency room for returning pain not controlled by ibuprofen or Tylenol.  Nausea vomiting, fever, chills, abdominal pain, urinary symptoms, or any new/concerning symptoms. Reason for ED Visit:  - Left flank/abdominal pain worse over the past few days    Findings/Diagnosis:  - Uterus mass concerning for uterine cancer with large left ovarian cyst    CT scan findings:   - Enlarged uterus with large fundal mass (12.6 x 21.1 x 15.2 cm)   - Left ovarian cyst (7.5 x 5.5 x 3.0 cm)   - Right kidney cyst (lower pole) concerning for hemorrhagic cyst   - Large splenic cyst (8.3 x 5.3 cm)   - Moderate umbilical hernia with no evidence of incarceration   - Colonic diverticulosis with no evidence of acute diverticulitis    Please return to the ED immediately for any new, worsening, or concerning symptoms including, but not limited to:   - Worsening abdominal pain   - Blood in the urine   - Vaginal discharge or bleeding    Please take the following medications at home:   - Acetaminophen (Tylenol) 500 to 1000 mg every 6-8 hours as needed for pain   - Ibuprofen (Advil or Motrin) 400 to 600 mg every 6-8 hours as needed for pain, take with food   - Alternate acetaminophen and ibuprofen every 3 hours for optimal pain coverage    Please follow up with the gynecologist/oncologist regarding this ED visit.    Thank you for choosing us for your care.

## 2025-04-20 NOTE — ED ADULT TRIAGE NOTE - CHIEF COMPLAINT QUOTE
c/o intermittent non radiating left flank pain x2 days denies urinary sx, fevers, n/v/d. denies medical hx.

## 2025-04-20 NOTE — ED ADULT NURSE NOTE - OBJECTIVE STATEMENT
RAY RN- Pt received to room 1 . Pt is a 49 year old F  with hx of gerd of  Pt presented to ED c/o L flank pain. patient states around 2am she was awaken with extreme L flank pain with burping. patient states she took gasx with no relief and not able to tolerate pain.  denies chest pain, SOB, headache, dizziness, abdominal pain, n/v/d, urinary symptoms, fevers/chills, numbness/tingling.   Pt is A&Ox4, ambulatory without assistance. neuro/sensory intact. airway patent, speaking clearly in full sentences. breathing is even and unlabored. . spontaneous movement of all extremities. 20G RAC IV placed, +blood return, flushes without difficulty. labs collected and sent. medications administered as ordered. awaiting imaging. comfort measures provided. stretcher set in lowest position, call bell within reach, safety maintained. patient report given to primary nurse Farida

## 2025-04-20 NOTE — ED PROVIDER NOTE - PATIENT PORTAL LINK FT
You can access the FollowMyHealth Patient Portal offered by [facility] by registering at the following website: [email or web address]. By joining [employer]’s FollowMyHealth portal, you will also be able to view your health information using other applications (apps) compatible with our system.

## 2025-04-22 ENCOUNTER — NON-APPOINTMENT (OUTPATIENT)
Age: 50
End: 2025-04-22

## 2025-04-24 ENCOUNTER — APPOINTMENT (OUTPATIENT)
Dept: GYNECOLOGIC ONCOLOGY | Facility: CLINIC | Age: 50
End: 2025-04-24
Payer: MEDICAID

## 2025-04-24 ENCOUNTER — NON-APPOINTMENT (OUTPATIENT)
Age: 50
End: 2025-04-24

## 2025-04-24 VITALS
WEIGHT: 236 LBS | BODY MASS INDEX: 39.32 KG/M2 | DIASTOLIC BLOOD PRESSURE: 102 MMHG | TEMPERATURE: 98.7 F | HEIGHT: 65 IN | OXYGEN SATURATION: 98 % | RESPIRATION RATE: 18 BRPM | SYSTOLIC BLOOD PRESSURE: 168 MMHG | HEART RATE: 65 BPM

## 2025-04-24 DIAGNOSIS — D25.9 LEIOMYOMA OF UTERUS, UNSPECIFIED: ICD-10-CM

## 2025-04-24 DIAGNOSIS — R10.2 PELVIC AND PERINEAL PAIN: ICD-10-CM

## 2025-04-24 DIAGNOSIS — N94.89 OTHER SPECIFIED CONDITIONS ASSOCIATED WITH FEMALE GENITAL ORGANS AND MENSTRUAL CYCLE: ICD-10-CM

## 2025-04-24 PROCEDURE — 99459 PELVIC EXAMINATION: CPT

## 2025-04-24 PROCEDURE — 99205 OFFICE O/P NEW HI 60 MIN: CPT

## 2025-04-24 RX ORDER — TRAMADOL HYDROCHLORIDE 50 MG/1
50 TABLET, COATED ORAL
Qty: 30 | Refills: 0 | Status: ACTIVE | COMMUNITY
Start: 2025-04-24 | End: 1900-01-01

## 2025-04-24 RX ORDER — LOSARTAN POTASSIUM 100 MG/1
TABLET, FILM COATED ORAL
Refills: 0 | Status: ACTIVE | COMMUNITY

## 2025-04-28 ENCOUNTER — APPOINTMENT (OUTPATIENT)
Dept: SURGERY | Facility: CLINIC | Age: 50
End: 2025-04-28
Payer: MEDICAID

## 2025-04-28 VITALS
TEMPERATURE: 98.3 F | HEIGHT: 65 IN | BODY MASS INDEX: 39.32 KG/M2 | SYSTOLIC BLOOD PRESSURE: 147 MMHG | OXYGEN SATURATION: 98 % | HEART RATE: 55 BPM | DIASTOLIC BLOOD PRESSURE: 93 MMHG | WEIGHT: 236 LBS

## 2025-04-28 PROCEDURE — 99204 OFFICE O/P NEW MOD 45 MIN: CPT

## 2025-05-01 ENCOUNTER — OUTPATIENT (OUTPATIENT)
Dept: OUTPATIENT SERVICES | Facility: HOSPITAL | Age: 50
LOS: 1 days | End: 2025-05-01
Payer: MEDICAID

## 2025-05-01 ENCOUNTER — APPOINTMENT (OUTPATIENT)
Dept: MRI IMAGING | Facility: CLINIC | Age: 50
End: 2025-05-01
Payer: MEDICAID

## 2025-05-01 DIAGNOSIS — N94.89 OTHER SPECIFIED CONDITIONS ASSOCIATED WITH FEMALE GENITAL ORGANS AND MENSTRUAL CYCLE: ICD-10-CM

## 2025-05-01 DIAGNOSIS — D25.9 LEIOMYOMA OF UTERUS, UNSPECIFIED: ICD-10-CM

## 2025-05-01 PROCEDURE — 74183 MRI ABD W/O CNTR FLWD CNTR: CPT | Mod: 26

## 2025-05-01 PROCEDURE — 72197 MRI PELVIS W/O & W/DYE: CPT | Mod: 26

## 2025-05-01 PROCEDURE — 74183 MRI ABD W/O CNTR FLWD CNTR: CPT

## 2025-05-01 PROCEDURE — A9585: CPT

## 2025-05-01 PROCEDURE — 72197 MRI PELVIS W/O & W/DYE: CPT

## 2025-05-12 ENCOUNTER — OUTPATIENT (OUTPATIENT)
Dept: OUTPATIENT SERVICES | Facility: HOSPITAL | Age: 50
LOS: 1 days | End: 2025-05-12

## 2025-05-12 VITALS
OXYGEN SATURATION: 98 % | TEMPERATURE: 98 F | HEART RATE: 66 BPM | HEIGHT: 65 IN | RESPIRATION RATE: 16 BRPM | DIASTOLIC BLOOD PRESSURE: 94 MMHG | SYSTOLIC BLOOD PRESSURE: 150 MMHG | WEIGHT: 233.03 LBS

## 2025-05-12 DIAGNOSIS — Z98.890 OTHER SPECIFIED POSTPROCEDURAL STATES: Chronic | ICD-10-CM

## 2025-05-12 DIAGNOSIS — K43.9 VENTRAL HERNIA WITHOUT OBSTRUCTION OR GANGRENE: ICD-10-CM

## 2025-05-12 DIAGNOSIS — D25.9 LEIOMYOMA OF UTERUS, UNSPECIFIED: ICD-10-CM

## 2025-05-12 DIAGNOSIS — I10 ESSENTIAL (PRIMARY) HYPERTENSION: ICD-10-CM

## 2025-05-12 LAB
A1C WITH ESTIMATED AVERAGE GLUCOSE RESULT: 5.4 % — SIGNIFICANT CHANGE UP (ref 4–5.6)
ANION GAP SERPL CALC-SCNC: 10 MMOL/L — SIGNIFICANT CHANGE UP (ref 7–14)
BLD GP AB SCN SERPL QL: NEGATIVE — SIGNIFICANT CHANGE UP
BUN SERPL-MCNC: 16 MG/DL — SIGNIFICANT CHANGE UP (ref 7–23)
CALCIUM SERPL-MCNC: 9.2 MG/DL — SIGNIFICANT CHANGE UP (ref 8.4–10.5)
CHLORIDE SERPL-SCNC: 106 MMOL/L — SIGNIFICANT CHANGE UP (ref 98–107)
CO2 SERPL-SCNC: 25 MMOL/L — SIGNIFICANT CHANGE UP (ref 22–31)
CREAT SERPL-MCNC: 1.08 MG/DL — SIGNIFICANT CHANGE UP (ref 0.5–1.3)
EGFR: 64 ML/MIN/1.73M2 — SIGNIFICANT CHANGE UP
EGFR: 64 ML/MIN/1.73M2 — SIGNIFICANT CHANGE UP
ESTIMATED AVERAGE GLUCOSE: 108 — SIGNIFICANT CHANGE UP
GLUCOSE SERPL-MCNC: 96 MG/DL — SIGNIFICANT CHANGE UP (ref 70–99)
POTASSIUM SERPL-MCNC: 3.7 MMOL/L — SIGNIFICANT CHANGE UP (ref 3.5–5.3)
POTASSIUM SERPL-SCNC: 3.7 MMOL/L — SIGNIFICANT CHANGE UP (ref 3.5–5.3)
RH IG SCN BLD-IMP: POSITIVE — SIGNIFICANT CHANGE UP
SODIUM SERPL-SCNC: 141 MMOL/L — SIGNIFICANT CHANGE UP (ref 135–145)

## 2025-05-12 RX ORDER — SODIUM CHLORIDE 9 G/1000ML
1000 INJECTION, SOLUTION INTRAVENOUS
Refills: 0 | Status: DISCONTINUED | OUTPATIENT
Start: 2025-05-16 | End: 2025-05-17

## 2025-05-12 NOTE — H&P PST ADULT - PROBLEM SELECTOR PLAN 2
Patient instructed to take nebivolol on day of procedure with small sips of water, verbalized understanding.

## 2025-05-12 NOTE — H&P PST ADULT - HISTORY OF PRESENT ILLNESS
49 yr old female presents with known fibroids and chronic left pelvic pain, states pelvic pain has worsened recently and went to ER for futher evaluation     Scheduled for exploratory laparotomy, total abdominal hysterectomy, bilateral salpingectomy unilateral salpingo oophorectomy  excision of pelvic mass possible staging and Dr Neves to perform open anterior abdominal wall hernia repair with mesh  49 yr old female presents with known fibroids and chronic left pelvic pain, states pelvic pain has worsened recently and went to ER for further evaluation, CT noted enlarged lobular uterus with a predominant fundal lesion measuring 12.6 x 21.1 x 15.6 cm and 7.5 x 5.5 x 3.0 cm left ovarian cyst; Patient also has large ventral hernia. Scheduled for exploratory laparotomy, total abdominal hysterectomy, bilateral salpingectomy unilateral salpingo oophorectomy  excision of pelvic mass possible staging and Dr Neves to perform open anterior abdominal wall hernia repair with mesh

## 2025-05-12 NOTE — H&P PST ADULT - NSICDXPASTMEDICALHX_GEN_ALL_CORE_FT
PAST MEDICAL HISTORY:  History of ventral hernia     HTN (hypertension)     Leiomyoma uteri     Obese     Seasonal allergies

## 2025-05-12 NOTE — H&P PST ADULT - GASTROINTESTINAL
negative normal/soft/nontender/nondistended/normal active bowel sounds soft/nondistended/normal active bowel sounds/tender

## 2025-05-12 NOTE — H&P PST ADULT - ATTENDING COMMENTS
Risks and expectations discussed    Proceed as planned    Isela Manzano MD, FACOG  Gynecologic Oncology

## 2025-05-12 NOTE — H&P PST ADULT - PROBLEM SELECTOR PLAN 1
Patient tentatively scheduled for surgery on: 5/16/25  Provided with verbal and written presurgical instructions  Patient instructed to hold NSAIDs, multivitamins and herbal supplements one week prior to surgery    Verbalized understanding  with teach back on the following: Famotidine for GI prophylaxis and chlorhexidine wash    Lab specimen drawn at [facility] today: CBC anemia workup with reflux, Type & Screen, ABO, HgA1c, BMP

## 2025-05-12 NOTE — H&P PST ADULT - TENDERNESS
Mom called and stated that [facility] was out of stock for the ADHD medication. Mom stated that she called around and [facility] has the medication in stock but only the 10mg chewable and not the 5. Mom was wanting RX sent and direction to break pill on half.    LLQ

## 2025-05-13 PROBLEM — Z78.9 OTHER SPECIFIED HEALTH STATUS: Chronic | Status: INACTIVE | Noted: 2021-01-09 | Resolved: 2025-05-12

## 2025-05-13 LAB
BASOPHILS # BLD AUTO: 0.01 K/UL — SIGNIFICANT CHANGE UP (ref 0–0.2)
BASOPHILS NFR BLD AUTO: 0.2 % — SIGNIFICANT CHANGE UP (ref 0–2)
EOSINOPHIL # BLD AUTO: 0.14 K/UL — SIGNIFICANT CHANGE UP (ref 0–0.5)
EOSINOPHIL NFR BLD AUTO: 3.1 % — SIGNIFICANT CHANGE UP (ref 0–6)
HCT VFR BLD CALC: 39.4 % — SIGNIFICANT CHANGE UP (ref 34.5–45)
HGB BLD-MCNC: 12.7 G/DL — SIGNIFICANT CHANGE UP (ref 11.5–15.5)
IMM GRANULOCYTES NFR BLD AUTO: 0.2 % — SIGNIFICANT CHANGE UP (ref 0–0.9)
LYMPHOCYTES # BLD AUTO: 1.24 K/UL — SIGNIFICANT CHANGE UP (ref 1–3.3)
LYMPHOCYTES # BLD AUTO: 27.1 % — SIGNIFICANT CHANGE UP (ref 13–44)
MCHC RBC-ENTMCNC: 28.7 PG — SIGNIFICANT CHANGE UP (ref 27–34)
MCHC RBC-ENTMCNC: 32.2 G/DL — SIGNIFICANT CHANGE UP (ref 32–36)
MCV RBC AUTO: 88.9 FL — SIGNIFICANT CHANGE UP (ref 80–100)
MONOCYTES # BLD AUTO: 0.74 K/UL — SIGNIFICANT CHANGE UP (ref 0–0.9)
MONOCYTES NFR BLD AUTO: 16.2 % — HIGH (ref 2–14)
NEUTROPHILS # BLD AUTO: 2.43 K/UL — SIGNIFICANT CHANGE UP (ref 1.8–7.4)
NEUTROPHILS NFR BLD AUTO: 53.2 % — SIGNIFICANT CHANGE UP (ref 43–77)
PLATELET # BLD AUTO: 178 K/UL — SIGNIFICANT CHANGE UP (ref 150–400)
RBC # BLD: 4.43 M/UL — SIGNIFICANT CHANGE UP (ref 3.8–5.2)
RBC # FLD: 13.9 % — SIGNIFICANT CHANGE UP (ref 10.3–14.5)
RH IG SCN BLD-IMP: POSITIVE — SIGNIFICANT CHANGE UP
WBC # BLD: 4.57 K/UL — SIGNIFICANT CHANGE UP (ref 3.8–10.5)
WBC # FLD AUTO: 4.57 K/UL — SIGNIFICANT CHANGE UP (ref 3.8–10.5)

## 2025-05-16 ENCOUNTER — TRANSCRIPTION ENCOUNTER (OUTPATIENT)
Age: 50
End: 2025-05-16

## 2025-05-16 ENCOUNTER — INPATIENT (INPATIENT)
Facility: HOSPITAL | Age: 50
LOS: 2 days | Discharge: HOME | End: 2025-05-19
Attending: GENERAL ACUTE CARE HOSPITAL | Admitting: GENERAL ACUTE CARE HOSPITAL
Payer: MEDICAID

## 2025-05-16 ENCOUNTER — RESULT REVIEW (OUTPATIENT)
Age: 50
End: 2025-05-16

## 2025-05-16 ENCOUNTER — APPOINTMENT (OUTPATIENT)
Dept: GYNECOLOGIC ONCOLOGY | Facility: HOSPITAL | Age: 50
End: 2025-05-16

## 2025-05-16 VITALS
SYSTOLIC BLOOD PRESSURE: 175 MMHG | DIASTOLIC BLOOD PRESSURE: 93 MMHG | OXYGEN SATURATION: 100 % | HEIGHT: 65 IN | HEART RATE: 66 BPM | RESPIRATION RATE: 16 BRPM | WEIGHT: 233.03 LBS | TEMPERATURE: 98 F

## 2025-05-16 DIAGNOSIS — K43.9 VENTRAL HERNIA WITHOUT OBSTRUCTION OR GANGRENE: ICD-10-CM

## 2025-05-16 DIAGNOSIS — Z98.890 OTHER SPECIFIED POSTPROCEDURAL STATES: Chronic | ICD-10-CM

## 2025-05-16 LAB — HCG UR QL: NEGATIVE — SIGNIFICANT CHANGE UP

## 2025-05-16 PROCEDURE — 88307 TISSUE EXAM BY PATHOLOGIST: CPT | Mod: 26

## 2025-05-16 PROCEDURE — 88305 TISSUE EXAM BY PATHOLOGIST: CPT | Mod: 26

## 2025-05-16 PROCEDURE — 88304 TISSUE EXAM BY PATHOLOGIST: CPT | Mod: 26

## 2025-05-16 PROCEDURE — 58925 REMOVAL OF OVARIAN CYST(S): CPT | Mod: LT

## 2025-05-16 PROCEDURE — 49594 RPR AA HRN 1ST 3-10 NCR/STRN: CPT

## 2025-05-16 PROCEDURE — 88112 CYTOPATH CELL ENHANCE TECH: CPT | Mod: 26

## 2025-05-16 PROCEDURE — 58150 TOTAL HYSTERECTOMY: CPT

## 2025-05-16 PROCEDURE — 88331 PATH CONSLTJ SURG 1 BLK 1SPC: CPT | Mod: 26

## 2025-05-16 PROCEDURE — 88305 TISSUE EXAM BY PATHOLOGIST: CPT | Mod: 26,XP

## 2025-05-16 DEVICE — INTERCEED 5 X 6" XL: Type: IMPLANTABLE DEVICE | Status: FUNCTIONAL

## 2025-05-16 RX ORDER — HYDROMORPHONE/SOD CHLOR,ISO/PF 2 MG/10 ML
0.5 SYRINGE (ML) INJECTION
Refills: 0 | Status: DISCONTINUED | OUTPATIENT
Start: 2025-05-16 | End: 2025-05-16

## 2025-05-16 RX ORDER — OXYCODONE HYDROCHLORIDE 30 MG/1
5 TABLET ORAL EVERY 6 HOURS
Refills: 0 | Status: DISCONTINUED | OUTPATIENT
Start: 2025-05-16 | End: 2025-05-19

## 2025-05-16 RX ORDER — ONDANSETRON HCL/PF 4 MG/2 ML
4 VIAL (ML) INJECTION ONCE
Refills: 0 | Status: DISCONTINUED | OUTPATIENT
Start: 2025-05-16 | End: 2025-05-16

## 2025-05-16 RX ORDER — SIMETHICONE 80 MG
80 TABLET,CHEWABLE ORAL EVERY 6 HOURS
Refills: 0 | Status: DISCONTINUED | OUTPATIENT
Start: 2025-05-16 | End: 2025-05-19

## 2025-05-16 RX ORDER — HEPARIN SODIUM 1000 [USP'U]/ML
5000 INJECTION INTRAVENOUS; SUBCUTANEOUS EVERY 8 HOURS
Refills: 0 | Status: DISCONTINUED | OUTPATIENT
Start: 2025-05-16 | End: 2025-05-17

## 2025-05-16 RX ORDER — SODIUM CHLORIDE 9 G/1000ML
1000 INJECTION, SOLUTION INTRAVENOUS
Refills: 0 | Status: DISCONTINUED | OUTPATIENT
Start: 2025-05-16 | End: 2025-05-18

## 2025-05-16 RX ORDER — METOPROLOL SUCCINATE 50 MG/1
5 TABLET, EXTENDED RELEASE ORAL EVERY 6 HOURS
Refills: 0 | Status: DISCONTINUED | OUTPATIENT
Start: 2025-05-16 | End: 2025-05-19

## 2025-05-16 RX ORDER — SENNA 187 MG
2 TABLET ORAL AT BEDTIME
Refills: 0 | Status: DISCONTINUED | OUTPATIENT
Start: 2025-05-16 | End: 2025-05-19

## 2025-05-16 RX ORDER — NEBIVOLOL 2.5 MG/1
1 TABLET ORAL
Refills: 0 | DISCHARGE

## 2025-05-16 RX ORDER — FENTANYL CITRATE-0.9 % NACL/PF 100MCG/2ML
50 SYRINGE (ML) INTRAVENOUS
Refills: 0 | Status: DISCONTINUED | OUTPATIENT
Start: 2025-05-16 | End: 2025-05-16

## 2025-05-16 RX ORDER — KETOROLAC TROMETHAMINE 30 MG/ML
15 INJECTION, SOLUTION INTRAMUSCULAR; INTRAVENOUS EVERY 6 HOURS
Refills: 0 | Status: DISCONTINUED | OUTPATIENT
Start: 2025-05-16 | End: 2025-05-17

## 2025-05-16 RX ORDER — ONDANSETRON HCL/PF 4 MG/2 ML
4 VIAL (ML) INJECTION EVERY 6 HOURS
Refills: 0 | Status: DISCONTINUED | OUTPATIENT
Start: 2025-05-16 | End: 2025-05-19

## 2025-05-16 RX ORDER — NEBIVOLOL 2.5 MG/1
10 TABLET ORAL EVERY 24 HOURS
Refills: 0 | Status: DISCONTINUED | OUTPATIENT
Start: 2025-05-17 | End: 2025-05-19

## 2025-05-16 RX ORDER — ACETAMINOPHEN 500 MG/5ML
1000 LIQUID (ML) ORAL EVERY 6 HOURS
Refills: 0 | Status: DISCONTINUED | OUTPATIENT
Start: 2025-05-16 | End: 2025-05-17

## 2025-05-16 RX ORDER — MONTELUKAST SODIUM 10 MG/1
1 TABLET ORAL
Refills: 0 | DISCHARGE

## 2025-05-16 RX ORDER — METOCLOPRAMIDE HCL 10 MG
10 TABLET ORAL ONCE
Refills: 0 | Status: DISCONTINUED | OUTPATIENT
Start: 2025-05-16 | End: 2025-05-16

## 2025-05-16 RX ORDER — MONTELUKAST SODIUM 10 MG/1
10 TABLET ORAL DAILY
Refills: 0 | Status: DISCONTINUED | OUTPATIENT
Start: 2025-05-16 | End: 2025-05-19

## 2025-05-16 RX ADMIN — Medication 1 APPLICATION(S): at 11:06

## 2025-05-16 RX ADMIN — KETOROLAC TROMETHAMINE 15 MILLIGRAM(S): 30 INJECTION, SOLUTION INTRAMUSCULAR; INTRAVENOUS at 18:19

## 2025-05-16 RX ADMIN — HEPARIN SODIUM 5000 UNIT(S): 1000 INJECTION INTRAVENOUS; SUBCUTANEOUS at 22:51

## 2025-05-16 NOTE — DISCHARGE NOTE PROVIDER - HOSPITAL COURSE
Patient underwent an uncomplicated Ex-Lap VINCENT BS, L ovarian cystectomy, primary ventral hernia repair. EBL: 350. Hct: 39.4->***.    POD#0 Pain was well controlled with IV Tylenol and oxycodone, and patient tolerated clears. Patient was transitioned to oral analgesics and pain was well controlled.     On POD#0, Patient's diet was advanced and she tolerated regular diet without issues.  Ly catheter was discontinued and patient voided without issues.   POD#1 *** Labs drawn post-operatively and on POD#1 were stable compared to pre-op. Routine post-operative care was performed.  No notable events.   On day of discharge, patient was deemed stable for discharge as she was meeting postoperative milestones - tolerating regular diet, ambulating without difficulty, voiding, and pain was well controlled on oral medications. Throughout admission, patient received DVT prophylaxis with SCDs and early ambulation.     Upon discharge on POD#***, the patient is ambulating, voiding spontaneously, tolerating oral intake, pain was well controlled with oral medication, and vital signs were stable. Patient was instructed to follow up with Dr. Manzano in 2 weeks.   Patient underwent an uncomplicated Ex-Lap VINCENT BS, L ovarian cystectomy, primary ventral hernia repair. EBL: 350. Hct: 39.4->***.    POD#0 Pain was well controlled with IV Tylenol and oxycodone, and patient tolerated clears. Patient was transitioned to oral analgesics and pain was well controlled. Patient's diet was advanced and she tolerated regular diet without issues.     On POD#1, Ly catheter was discontinued and patient voided without issues. Labs drawn post-operatively and on POD#1 were ***compared to pre-op. Routine post-operative care was performed.  No notable events.    Upon discharge on POD#***, the patient is ambulating, voiding spontaneously, tolerating oral intake, pain was well controlled with oral medication, and vital signs were stable. Throughout admission, patient received DVT prophylaxis with SCDs and early ambulation. Patient was instructed to follow up with Dr. Manzano in 2 weeks.    Patient underwent an uncomplicated Ex-Lap VINCENT BS, L ovarian cystectomy, primary ventral hernia repair. EBL: 350. Hct: 39.4->37->34.7 ->34 .    POD#0 Pain was well controlled with IV Tylenol and oxycodone, and patient tolerated clears. Patient was transitioned to oral analgesics and pain was well controlled. Patient's diet was advanced and she tolerated regular diet without issues.     On POD#1, Ly catheter was discontinued and patient voided without issues. Labs drawn post-operatively and on POD#1 were  stablecompared to pre-op. Routine post-operative care was performed.  No notable events.    On POD#2, patient continued to meet appropriate post-operative milestones however BPs elevated and required IV hydralazine.     On POD#3, patient with continued elevated BPs despite being on home nebivilol, thus internal medicine consulted for further BP management.   ****    Upon discharge on POD#***, the patient is ambulating, voiding spontaneously, tolerating oral intake, pain was well controlled with oral medication, and vital signs were stable. Throughout admission, patient received DVT prophylaxis with SCDs and early ambulation. Patient was instructed to follow up with Dr. Manzano in 2 weeks.    Patient underwent an uncomplicated Ex-Lap VINCENT BS, L ovarian cystectomy, primary ventral hernia repair. EBL: 350. Hct: 39.4->37->34.7 ->34 .    POD#0 Pain was well controlled with IV Tylenol and oxycodone, and patient tolerated clears. Patient was transitioned to oral analgesics and pain was well controlled. Patient's diet was advanced and she tolerated regular diet without issues.     On POD#1, Ly catheter was discontinued and patient voided without issues. Labs drawn post-operatively and on POD#1 were  stablecompared to pre-op. Routine post-operative care was performed.  No notable events.    On POD#2, patient continued to meet appropriate post-operative milestones however BPs elevated and required IV hydralazine.     On POD#3, patient with continued elevated BPs despite being on home nebivolol, thus internal medicine consulted for further BP management. Recommended adding losartan 25mg qd in addition to home nebivolol with close follow up with PCP for BP check in 1 week.     Upon discharge on POD#3, the patient is ambulating, voiding spontaneously, tolerating oral intake, pain was well controlled with oral medication, and vital signs were stable. Throughout admission, patient received DVT prophylaxis with SCDs and early ambulation. Patient was instructed to follow up with Dr. Manzano in on June 16, 2025 at 4pm.

## 2025-05-16 NOTE — PACU DISCHARGE NOTE - NSPTMEETSDISCHCRITERIADT_GEN_A_CORE
16-May-2025 20:31
16-May-2025 20:36
Hpi Title: Evaluation of Skin Lesions
How Severe Are Your Spot(S)?: moderate

## 2025-05-16 NOTE — BRIEF OPERATIVE NOTE - NSICDXBRIEFPROCEDURE_GEN_ALL_CORE_FT
PROCEDURES:  Exam under anesthesia, pelvic 16-May-2025 15:51:49  Marquita Gallo  Exploratory laparotomy 16-May-2025 15:52:26  Marquita Gallo  Total abdominal hysterectomy with bilateral salpingectomy 16-May-2025 15:53:13  Marquita Gallo   PROCEDURES:  Exam under anesthesia, pelvic 16-May-2025 15:51:49  Marquita Gallo  Exploratory laparotomy 16-May-2025 15:52:26  Marquita Gallo  Total abdominal hysterectomy with bilateral salpingectomy 16-May-2025 15:53:13  Marquita Gallo  Open left ovarian cystectomy 16-May-2025 17:52:47  Marquita Gallo

## 2025-05-16 NOTE — DISCHARGE NOTE PROVIDER - NSDCCPCAREPLAN_GEN_ALL_CORE_FT
PRINCIPAL DISCHARGE DIAGNOSIS  Diagnosis: Pelvic mass in female  Assessment and Plan of Treatment:       SECONDARY DISCHARGE DIAGNOSES  Diagnosis: Uterine fibroid  Assessment and Plan of Treatment:

## 2025-05-16 NOTE — DISCHARGE NOTE PROVIDER - NSDCCPTREATMENT_GEN_ALL_CORE_FT
06-Aug-2023 20:03
PRINCIPAL PROCEDURE  Procedure: Total abdominal hysterectomy with bilateral salpingectomy  Findings and Treatment:       SECONDARY PROCEDURE  Procedure: Exam under anesthesia, pelvic  Findings and Treatment:     Procedure: Open left ovarian cystectomy  Findings and Treatment:     Procedure: Abdominoplasty with repair of ventral hernia  Findings and Treatment:     Procedure: Exploratory laparotomy  Findings and Treatment:

## 2025-05-16 NOTE — BRIEF OPERATIVE NOTE - OPERATION/FINDINGS
See GYN Brief Op Note for the Portion of Procedure    Umbilical Hernia sac excised. Given concern for L groin infection, decision made to perform primary repair without mesh. Midline fascia closed using reinforced tension line technique with 0 and 2-0 PDS, imbricated on top with 2-0 stratafix. Multiple layers of 3-0 Vicryls to approximate subcutaneous tissure. Skin closed with 3-0 Quill and Dermabond Prineo

## 2025-05-16 NOTE — DISCHARGE NOTE PROVIDER - CARE PROVIDER_API CALL
Isela Manzano  Gynecologic Oncology  [address] [place] [id number]  Phone: [phone]  Fax: [phone]  Established Patient  Scheduled Appointment: 06/16/2025 04:00 PM

## 2025-05-16 NOTE — BRIEF OPERATIVE NOTE - SPECIMENS
Hernia Sac
Uterus and cervix, R fallopian tube with paratubal cyst, L fallopian tube with paratubal cyst

## 2025-05-16 NOTE — BRIEF OPERATIVE NOTE - NSICDXBRIEFPOSTOP_GEN_ALL_CORE_FT
POST-OP DIAGNOSIS:  Uterine fibroid 16-May-2025 15:54:58  Marquita Gallo  Pelvic mass in female 16-May-2025 15:54:53  Marquita Gallo  
POST-OP DIAGNOSIS:  Uterine fibroid 16-May-2025 15:54:58  Marquita Gallo  Umbilical hernia 16-May-2025 18:08:40  Izzy Hodges

## 2025-05-16 NOTE — CHART NOTE - NSCHARTNOTEFT_GEN_A_CORE
Post Operative Note  Patient: ROSEMARY KENNEDY 49y (1975) Female   MRN: [id number]  Location: [facility]  Visit: 05-16-25 Inpatient  Date: 05-16-25 @ 20:28    Procedure: S/P Exploratory laparotomy, Total abdominal hysterectomy with bilateral salpingectomy, Open left ovarian cystectomy (OB-GYN) and Abdominoplasty with repair of ventral hernia (Gen-Surg)    Subjective: Patient seen and examined post operatively. Reports pain as controlled. Denies nausea, vomiting, fever, chills, chest pain, SOB, cough.      Objective:  Vitals: T(F): 98.1 (05-16-25 @ 19:00), Max: 98.1 (05-16-25 @ 19:00)  HR: 66 (05-16-25 @ 20:00)  BP: 147/84 (05-16-25 @ 20:00) (121/76 - 175/93)  RR: 15 (05-16-25 @ 20:00)  SpO2: 95% (05-16-25 @ 20:00)    Vent Settings:     Is & Os:  05-16-25 @ 07:01  -  05-16-25 @ 20:28  --------------------------------------------------------  IN: 575 mL / OUT: 210 mL / NET: 365 mL        Physical Examination:  General: NAD, resting comfortably in bed  Abdomen: softly distended, appropriately tender, surgical incisions are c/d/i.     Imaging:  No post-op imaging studies    Assessment:  49yFemale patient S/P Exploratory laparotomy, Total abdominal hysterectomy with bilateral salpingectomy, Open left ovarian cystectomy (OB-GYN) and Abdominoplasty with repair of ventral hernia (Gen-Surg), reports pain as well controlled and is recovering as expected.     Plan:  Gobal care per primary    Date/Time: 05-16-25 @ 20:28
R4 OBGYN POST-OP CHECK    S: Patient seen and evaluated at bedside.  Pt awake and alert resting comfortably in bed  Patient reports pain controlled with analgesia. Pt denies N/V, SOB, CP, palpitations, fever/chills. Tolerating PO.  Not OOB yet. Keyes in place    O:   T(C): --  HR: --  BP: --  RR: --  SpO2: --  Wt(kg): --  I&O's Summary    16 May 2025 07:01  -  17 May 2025 05:27  --------------------------------------------------------  IN: 1195 mL / OUT: 355 mL / NET: 840 mL        Gen: Resting comfortably in bed, NAD  CV: S1S2, RRR  Lungs: CTA B/L  Abd: soft, appropriately tender, occasional BS x 4 quadrants.    Inc: midline vertical clean/dry/intact w/ dermabond prineo in place  Ext: SCD's in place and functional, non-tender b/l, no edema        A/P: 49y Female s/p Ex-Lap VINCENT BS, L ovarian cystectomy, primary ventral hernia repair examiend for post operative check. Patient doing well post operatively, beginning to reach milestones    Neuro: Pain controlled with IV Tylenol, Toradol, Oxy PRN  CV: Hemodynamically stable.  Monitor VS. CBC in AM.   -Lopressor PRN for elevated BP  Pulm: Saturating well on room air.  Encourage OOB and incentive spirometer use.   -Montelukast for asthma history  GI: Advance to regular diet. Anti-emetics PRN.  -C/w senna, simethicone, zofran  : Consider keyes removal in the AM  FEN: Electrolytes: LR@125cc/hr. BMP in AM.   Heme: DVT ppx w/ SCD's while in bed. Early ambulation, initially with assistance then as tolerated.  Continue HSQ   ID: Afebrile  Dispo: Discharge from PACU when criteria met.     BENJA Burgos PGY4

## 2025-05-16 NOTE — DISCHARGE NOTE PROVIDER - NSDCMRMEDTOKEN_GEN_ALL_CORE_FT
ibuprofen 800 mg oral tablet: 1 tab(s) orally 3 times a day as needed for  severe pain  montelukast 10 mg oral tablet: 1 tab(s) orally once a day  nebivolol 10 mg oral tablet: 1 tab(s) orally once a day  traMADol 50 mg oral tablet: 1 tab(s) orally every 6 hours as needed for  moderate pain  Vitamin D2 50,000 1 capsule weekly :    ibuprofen 800 mg oral tablet: 1 tab(s) orally 3 times a day as needed for  severe pain  nebivolol 10 mg oral tablet: 1 tab(s) orally once a day  traMADol 50 mg oral tablet: 1 tab(s) orally every 6 hours as needed for  moderate pain  Vitamin D2 50,000 1 capsule weekly :    acetaminophen 325 mg oral tablet: 3 tab(s) orally every 6 hours  ibuprofen 800 mg oral tablet: 1 tab(s) orally 3 times a day as needed for  moderate pain  losartan 25 mg oral tablet: 1 tab(s) orally once a day  montelukast 10 mg oral tablet: 1 tab(s) orally once a day  nebivolol 10 mg oral tablet: 1 tab(s) orally once a day  traMADol 50 mg oral tablet: 1 tab(s) orally every 6 hours as needed for  severe pain  Vitamin D2 50,000 1 capsule weekly :

## 2025-05-16 NOTE — DISCHARGE NOTE PROVIDER - NS AS DC PROVIDER CONTACT Y/N MULTI
Spoke to pt regarding Dr. Younger's rec's. Pt v/u and agreeable to plan.     PSR to call pt to schedule zio patch appt, followed by an appointment with Dr. Younger 3 weeks after.    Yes

## 2025-05-16 NOTE — BRIEF OPERATIVE NOTE - NSICDXBRIEFPREOP_GEN_ALL_CORE_FT
PRE-OP DIAGNOSIS:  Pelvic mass in female 16-May-2025 15:54:34  Marquita Gallo  Uterine fibroid 16-May-2025 15:54:47  Marquita Gallo  
PRE-OP DIAGNOSIS:  Uterine fibroid 16-May-2025 15:54:47  Marquita Gallo  Umbilical hernia 16-May-2025 18:08:27  Izzy Hodges

## 2025-05-16 NOTE — BRIEF OPERATIVE NOTE - OPERATION/FINDINGS
EUA: Grossly normal external genitalia.  Uterus enlarged with palpable fibroid to upper abdomen just below xyphoid.    Laparotomy: Umbilical hernia.  Uterus enlarged with fundal fibroid approx 15cm - irregular in contour and with grossly dilated vessels along surface.  Multiple additional smaller subserosal fibroids along the anterior uterus and SILVANA.  R ovary grossly normal.  R fallopian tube with small pendunculated paratubal cyst - simple appearing approx 1cm, L fallopian tube with approx 4-5cm simple appearing paratubal cyst.  L ovary also adhered to paratubal cyst - resected off cyst and oversewn with vicryl suture as well as cauterized at resected end with excellent hemostasis.  Vaginal cuff oversewn with 0-vicryl suture with excellent hemostasis.  Interceed placed over vaginal cuff and b/l ovaries to prevent adhesions.  Abdominal survey (bowel, bladder, appendix, omentum) wnl.    Uterus and cervix for frozen section (frozen = benign fibroid).  Uterus and cervix weight: 3178g  Dr. Neves of General Surgery then scrubbed for hernia repair and abdominal closure.  Please see separate operative note for further details.   EUA: Grossly normal external genitalia.  Uterus enlarged with palpable fibroid to upper abdomen just below xyphoid.    Laparotomy: Large umbilical hernia.  Uterus enlarged with fundal fibroid approx 20cm - irregular in contour and with grossly dilated vessels along surface.  Multiple additional smaller subserosal fibroids along the anterior uterus and SILVANA.  R ovary grossly normal.  R fallopian tube with small pendunculated paratubal cyst - simple appearing approx 1cm, L fallopian tube with approx 4-5cm simple appearing paratubal cyst.  L ovary also adhered to paratubal cyst - resected off cyst and oversewn with vicryl suture as well as cauterized at resected end with excellent hemostasis.  Vaginal cuff oversewn with 0-vicryl suture with excellent hemostasis.  Interceed placed over vaginal cuff and b/l ovaries to prevent adhesions.  Abdominal survey (bowel, bladder, appendix, omentum) wnl.    Uterus and cervix for frozen section (frozen = benign fibroid).  Uterus and cervix weight: 3178g  Dr. Neves of General Surgery then scrubbed for hernia repair and abdominal closure.  Please see separate operative note for further details.

## 2025-05-16 NOTE — DISCHARGE NOTE PROVIDER - PROVIDER TOKENS
PROVIDER:[TOKEN:[[id number]],SCHEDULEDAPPT:[06/16/2025],SCHEDULEDAPPTTIME:[04:00 PM],ESTABLISHEDPATIENT:[T]]

## 2025-05-17 LAB
ALBUMIN SERPL ELPH-MCNC: 3.2 G/DL — LOW (ref 3.3–5)
ALP SERPL-CCNC: 42 U/L — SIGNIFICANT CHANGE UP (ref 40–120)
ALT FLD-CCNC: 9 U/L — SIGNIFICANT CHANGE UP (ref 4–33)
ANION GAP SERPL CALC-SCNC: 11 MMOL/L — SIGNIFICANT CHANGE UP (ref 7–14)
AST SERPL-CCNC: 18 U/L — SIGNIFICANT CHANGE UP (ref 4–32)
BASOPHILS # BLD AUTO: 0.02 K/UL — SIGNIFICANT CHANGE UP (ref 0–0.2)
BASOPHILS NFR BLD AUTO: 0.2 % — SIGNIFICANT CHANGE UP (ref 0–2)
BILIRUB SERPL-MCNC: 0.3 MG/DL — SIGNIFICANT CHANGE UP (ref 0.2–1.2)
BUN SERPL-MCNC: 14 MG/DL — SIGNIFICANT CHANGE UP (ref 7–23)
CALCIUM SERPL-MCNC: 8.2 MG/DL — LOW (ref 8.4–10.5)
CHLORIDE SERPL-SCNC: 103 MMOL/L — SIGNIFICANT CHANGE UP (ref 98–107)
CO2 SERPL-SCNC: 21 MMOL/L — LOW (ref 22–31)
CREAT SERPL-MCNC: 1.04 MG/DL — SIGNIFICANT CHANGE UP (ref 0.5–1.3)
EGFR: 67 ML/MIN/1.73M2 — SIGNIFICANT CHANGE UP
EGFR: 67 ML/MIN/1.73M2 — SIGNIFICANT CHANGE UP
EOSINOPHIL # BLD AUTO: 0 K/UL — SIGNIFICANT CHANGE UP (ref 0–0.5)
EOSINOPHIL NFR BLD AUTO: 0 % — SIGNIFICANT CHANGE UP (ref 0–6)
GLUCOSE SERPL-MCNC: 103 MG/DL — HIGH (ref 70–99)
HCT VFR BLD CALC: 37 % — SIGNIFICANT CHANGE UP (ref 34.5–45)
HGB BLD-MCNC: 12 G/DL — SIGNIFICANT CHANGE UP (ref 11.5–15.5)
IANC: 8.52 K/UL — HIGH (ref 1.8–7.4)
IMM GRANULOCYTES NFR BLD AUTO: 0.3 % — SIGNIFICANT CHANGE UP (ref 0–0.9)
LYMPHOCYTES # BLD AUTO: 0.93 K/UL — LOW (ref 1–3.3)
LYMPHOCYTES # BLD AUTO: 8.8 % — LOW (ref 13–44)
MAGNESIUM SERPL-MCNC: 1.7 MG/DL — SIGNIFICANT CHANGE UP (ref 1.6–2.6)
MCHC RBC-ENTMCNC: 29.1 PG — SIGNIFICANT CHANGE UP (ref 27–34)
MCHC RBC-ENTMCNC: 32.4 G/DL — SIGNIFICANT CHANGE UP (ref 32–36)
MCV RBC AUTO: 89.8 FL — SIGNIFICANT CHANGE UP (ref 80–100)
MONOCYTES # BLD AUTO: 1.08 K/UL — HIGH (ref 0–0.9)
MONOCYTES NFR BLD AUTO: 10.2 % — SIGNIFICANT CHANGE UP (ref 2–14)
NEUTROPHILS # BLD AUTO: 8.52 K/UL — HIGH (ref 1.8–7.4)
NEUTROPHILS NFR BLD AUTO: 80.5 % — HIGH (ref 43–77)
NRBC # BLD AUTO: 0 K/UL — SIGNIFICANT CHANGE UP (ref 0–0)
NRBC # FLD: 0 K/UL — SIGNIFICANT CHANGE UP (ref 0–0)
NRBC BLD AUTO-RTO: 0 /100 WBCS — SIGNIFICANT CHANGE UP (ref 0–0)
PHOSPHATE SERPL-MCNC: 3.7 MG/DL — SIGNIFICANT CHANGE UP (ref 2.5–4.5)
PLATELET # BLD AUTO: 151 K/UL — SIGNIFICANT CHANGE UP (ref 150–400)
POTASSIUM SERPL-MCNC: 4.3 MMOL/L — SIGNIFICANT CHANGE UP (ref 3.5–5.3)
POTASSIUM SERPL-SCNC: 4.3 MMOL/L — SIGNIFICANT CHANGE UP (ref 3.5–5.3)
PROT SERPL-MCNC: 6.1 G/DL — SIGNIFICANT CHANGE UP (ref 6–8.3)
RBC # BLD: 4.12 M/UL — SIGNIFICANT CHANGE UP (ref 3.8–5.2)
RBC # FLD: 13.3 % — SIGNIFICANT CHANGE UP (ref 10.3–14.5)
SODIUM SERPL-SCNC: 135 MMOL/L — SIGNIFICANT CHANGE UP (ref 135–145)
WBC # BLD: 10.58 K/UL — HIGH (ref 3.8–10.5)
WBC # FLD AUTO: 10.58 K/UL — HIGH (ref 3.8–10.5)

## 2025-05-17 PROCEDURE — 99232 SBSQ HOSP IP/OBS MODERATE 35: CPT

## 2025-05-17 RX ORDER — IBUPROFEN 200 MG
600 TABLET ORAL EVERY 6 HOURS
Refills: 0 | Status: DISCONTINUED | OUTPATIENT
Start: 2025-05-17 | End: 2025-05-19

## 2025-05-17 RX ORDER — POLYETHYLENE GLYCOL 3350 17 G/17G
17 POWDER, FOR SOLUTION ORAL DAILY
Refills: 0 | Status: DISCONTINUED | OUTPATIENT
Start: 2025-05-17 | End: 2025-05-19

## 2025-05-17 RX ORDER — ENOXAPARIN SODIUM 100 MG/ML
40 INJECTION SUBCUTANEOUS EVERY 24 HOURS
Refills: 0 | Status: DISCONTINUED | OUTPATIENT
Start: 2025-05-17 | End: 2025-05-19

## 2025-05-17 RX ORDER — ACETAMINOPHEN 500 MG/5ML
975 LIQUID (ML) ORAL EVERY 6 HOURS
Refills: 0 | Status: DISCONTINUED | OUTPATIENT
Start: 2025-05-17 | End: 2025-05-19

## 2025-05-17 RX ADMIN — SODIUM CHLORIDE 125 MILLILITER(S): 9 INJECTION, SOLUTION INTRAVENOUS at 16:00

## 2025-05-17 RX ADMIN — HEPARIN SODIUM 5000 UNIT(S): 1000 INJECTION INTRAVENOUS; SUBCUTANEOUS at 05:47

## 2025-05-17 RX ADMIN — SODIUM CHLORIDE 125 MILLILITER(S): 9 INJECTION, SOLUTION INTRAVENOUS at 22:10

## 2025-05-17 RX ADMIN — KETOROLAC TROMETHAMINE 15 MILLIGRAM(S): 30 INJECTION, SOLUTION INTRAMUSCULAR; INTRAVENOUS at 09:56

## 2025-05-17 RX ADMIN — SODIUM CHLORIDE 125 MILLILITER(S): 9 INJECTION, SOLUTION INTRAVENOUS at 01:25

## 2025-05-17 RX ADMIN — Medication 1 APPLICATION(S): at 15:59

## 2025-05-17 RX ADMIN — KETOROLAC TROMETHAMINE 15 MILLIGRAM(S): 30 INJECTION, SOLUTION INTRAMUSCULAR; INTRAVENOUS at 04:08

## 2025-05-17 RX ADMIN — Medication 975 MILLIGRAM(S): at 22:10

## 2025-05-17 RX ADMIN — Medication 400 MILLIGRAM(S): at 05:47

## 2025-05-17 RX ADMIN — Medication 975 MILLIGRAM(S): at 15:58

## 2025-05-17 RX ADMIN — NEBIVOLOL 10 MILLIGRAM(S): 2.5 TABLET ORAL at 09:54

## 2025-05-17 RX ADMIN — Medication 1000 MILLIGRAM(S): at 01:25

## 2025-05-17 RX ADMIN — SODIUM CHLORIDE 125 MILLILITER(S): 9 INJECTION, SOLUTION INTRAVENOUS at 03:08

## 2025-05-17 RX ADMIN — MONTELUKAST SODIUM 10 MILLIGRAM(S): 10 TABLET ORAL at 15:52

## 2025-05-17 RX ADMIN — KETOROLAC TROMETHAMINE 15 MILLIGRAM(S): 30 INJECTION, SOLUTION INTRAMUSCULAR; INTRAVENOUS at 03:08

## 2025-05-17 RX ADMIN — SODIUM CHLORIDE 125 MILLILITER(S): 9 INJECTION, SOLUTION INTRAVENOUS at 17:31

## 2025-05-17 RX ADMIN — ENOXAPARIN SODIUM 40 MILLIGRAM(S): 100 INJECTION SUBCUTANEOUS at 15:59

## 2025-05-17 RX ADMIN — SODIUM CHLORIDE 125 MILLILITER(S): 9 INJECTION, SOLUTION INTRAVENOUS at 09:54

## 2025-05-17 RX ADMIN — Medication 975 MILLIGRAM(S): at 23:10

## 2025-05-17 RX ADMIN — Medication 1000 MILLIGRAM(S): at 06:17

## 2025-05-17 RX ADMIN — SODIUM CHLORIDE 145 MILLILITER(S): 9 INJECTION, SOLUTION INTRAVENOUS at 10:12

## 2025-05-17 RX ADMIN — Medication 400 MILLIGRAM(S): at 00:25

## 2025-05-17 NOTE — PROGRESS NOTE ADULT - SUBJECTIVE AND OBJECTIVE BOX
Gyn ONC Progress Note POD #1, HD#2    Subjective:   Patient seen and examined at bedside. No acute events overnight. No acute complaints. Pain well controlled. Patient not OOB yet. Tolerating a regular diet. No flatus yet. Ly in place. Denies lightheadedness, dizziness, CP, SOB, N/V, fevers, and chills.    Objective:  T(F): 97.9 (05-17-25 @ 01:00), Max: 98.1 (05-16-25 @ 19:00)  HR: 75 (05-17-25 @ 01:00) (60 - 75)  BP: 129/71 (05-17-25 @ 01:00) (121/76 - 175/93)  RR: 16 (05-17-25 @ 01:00) (14 - 19)  SpO2: 96% (05-17-25 @ 01:00) (95% - 100%)  Wt(kg): --  I&O's Summary    16 May 2025 07:01  -  17 May 2025 05:27  --------------------------------------------------------  IN: 1195 mL / OUT: 355 mL / NET: 840 mL      CAPILLARY BLOOD GLUCOSE          MEDICATIONS  (STANDING):  acetaminophen   IVPB .. 1000 milliGRAM(s) IV Intermittent every 6 hours  chlorhexidine 2% Cloths 1 Application(s) Topical daily  heparin   Injectable 5000 Unit(s) SubCutaneous every 8 hours  ketorolac   Injectable 15 milliGRAM(s) IV Push every 6 hours  lactated ringers. 1000 milliLiter(s) (125 mL/Hr) IV Continuous <Continuous>  lactated ringers. 1000 milliLiter(s) (30 mL/Hr) IV Continuous <Continuous>  montelukast 10 milliGRAM(s) Oral daily  nebivolol 10 milliGRAM(s) Oral every 24 hours    MEDICATIONS  (PRN):  metoprolol tartrate Injectable 5 milliGRAM(s) IV Push every 6 hours PRN give if BP >160/110  ondansetron Injectable 4 milliGRAM(s) IV Push every 6 hours PRN Nausea and/or Vomiting  oxyCODONE    IR 5 milliGRAM(s) Oral every 6 hours PRN Severe Pain (7 - 10)  senna 2 Tablet(s) Oral at bedtime PRN Constipation  simethicone 80 milliGRAM(s) Chew every 6 hours PRN Gas      Physical Exam:  Constitutional: NAD, A+O x3  CV: RRR  Lungs: clear to auscultation bilaterally  Abdomen: soft, nondistended, no guarding, no rebound, normal bowel sounds  Incision: Midline vertical incision clean, dry, intact without erythema or drainage.   Extremities: no lower extremity edema or calf tenderness bilaterally; venodynes in place    LABS:                             Gyn ONC Progress Note POD #1, HD#2    Subjective:   Patient seen and examined at bedside. No acute events overnight. No acute complaints. Pain well controlled. Patient not OOB yet. Tolerating water, no solid food yet. No flatus yet. Ly in place. Denies lightheadedness, dizziness, CP, SOB, N/V, fevers, and chills.    Objective:  T(F): 97.9 (05-17-25 @ 01:00), Max: 98.1 (05-16-25 @ 19:00)  HR: 75 (05-17-25 @ 01:00) (60 - 75)  BP: 129/71 (05-17-25 @ 01:00) (121/76 - 175/93)  RR: 16 (05-17-25 @ 01:00) (14 - 19)  SpO2: 96% (05-17-25 @ 01:00) (95% - 100%)  Wt(kg): --  I&O's Summary    16 May 2025 07:01  -  17 May 2025 05:27  --------------------------------------------------------  IN: 1195 mL / OUT: 355 mL / NET: 840 mL      CAPILLARY BLOOD GLUCOSE          MEDICATIONS  (STANDING):  acetaminophen   IVPB .. 1000 milliGRAM(s) IV Intermittent every 6 hours  chlorhexidine 2% Cloths 1 Application(s) Topical daily  heparin   Injectable 5000 Unit(s) SubCutaneous every 8 hours  ketorolac   Injectable 15 milliGRAM(s) IV Push every 6 hours  lactated ringers. 1000 milliLiter(s) (125 mL/Hr) IV Continuous <Continuous>  lactated ringers. 1000 milliLiter(s) (30 mL/Hr) IV Continuous <Continuous>  montelukast 10 milliGRAM(s) Oral daily  nebivolol 10 milliGRAM(s) Oral every 24 hours    MEDICATIONS  (PRN):  metoprolol tartrate Injectable 5 milliGRAM(s) IV Push every 6 hours PRN give if BP >160/110  ondansetron Injectable 4 milliGRAM(s) IV Push every 6 hours PRN Nausea and/or Vomiting  oxyCODONE    IR 5 milliGRAM(s) Oral every 6 hours PRN Severe Pain (7 - 10)  senna 2 Tablet(s) Oral at bedtime PRN Constipation  simethicone 80 milliGRAM(s) Chew every 6 hours PRN Gas      Physical Exam:  Constitutional: NAD, A+O x3  CV: RRR  Lungs: clear to auscultation bilaterally  Abdomen: soft, nondistended, no guarding, no rebound, normal bowel sounds  Incision: Midline vertical incision clean, dry, intact without erythema or drainage. Dermabond prineo in place with clean ABD pad and abdominal binder   Extremities: no lower extremity edema or calf tenderness bilaterally; venodynes in place    LABS:

## 2025-05-17 NOTE — PROGRESS NOTE ADULT - SUBJECTIVE AND OBJECTIVE BOX
Surgery Progress Note:    OVERNIGHT EVENTS: NAEO    SUBJECTIVE: Pt seen and examined at bedside. Patient comfortable and in no-apparent distress. Pain is controlled.     MEDICATIONS  (STANDING):  acetaminophen   IVPB .. 1000 milliGRAM(s) IV Intermittent every 6 hours  chlorhexidine 2% Cloths 1 Application(s) Topical daily  heparin   Injectable 5000 Unit(s) SubCutaneous every 8 hours  ketorolac   Injectable 15 milliGRAM(s) IV Push every 6 hours  lactated ringers. 1000 milliLiter(s) (125 mL/Hr) IV Continuous <Continuous>  lactated ringers. 1000 milliLiter(s) (30 mL/Hr) IV Continuous <Continuous>  montelukast 10 milliGRAM(s) Oral daily  nebivolol 10 milliGRAM(s) Oral every 24 hours    MEDICATIONS  (PRN):  metoprolol tartrate Injectable 5 milliGRAM(s) IV Push every 6 hours PRN give if BP >160/110  ondansetron Injectable 4 milliGRAM(s) IV Push every 6 hours PRN Nausea and/or Vomiting  oxyCODONE    IR 5 milliGRAM(s) Oral every 6 hours PRN Severe Pain (7 - 10)  senna 2 Tablet(s) Oral at bedtime PRN Constipation  simethicone 80 milliGRAM(s) Chew every 6 hours PRN Gas    T(C): 37 (05-17-25 @ 05:40), Max: 37 (05-17-25 @ 05:40)  HR: 64 (05-17-25 @ 05:40) (60 - 75)  BP: 122/57 (05-17-25 @ 05:40) (121/76 - 175/93)  RR: 17 (05-17-25 @ 05:40) (14 - 19)  SpO2: 100% (05-17-25 @ 05:40) (95% - 100%)    05-16-25 @ 07:01  -  05-17-25 @ 07:00  --------------------------------------------------------  IN: 1815 mL / OUT: 555 mL / NET: 1260 mL      LABS:                        12.0   10.58 )-----------( 151      ( 17 May 2025 04:18 )             37.0     05-17    135  |  103  |  14  ----------------------------<  103[H]  4.3   |  21[L]  |  1.04    Ca    8.2[L]      17 May 2025 04:18  Phos  3.7     05-17  Mg     1.70     05-17    TPro  6.1  /  Alb  3.2[L]  /  TBili  0.3  /  DBili  x   /  AST  18  /  ALT  9   /  AlkPhos  42  05-17      Urinalysis Basic - ( 17 May 2025 04:18 )    Color: x / Appearance: x / SG: x / pH: x  Gluc: 103 mg/dL / Ketone: x  / Bili: x / Urobili: x   Blood: x / Protein: x / Nitrite: x   Leuk Esterase: x / RBC: x / WBC x   Sq Epi: x / Non Sq Epi: x / Bacteria: x      PE:  Gen: NAD  CV: Pulse regular present  Resp: Nonlabored  Abdomen: softly distended, appropriately tender, surgical incisions are c/d/i.

## 2025-05-17 NOTE — PROGRESS NOTE ADULT - ASSESSMENT
49yFemale patient S/P Exploratory laparotomy, Total abdominal hysterectomy with bilateral salpingectomy, Open left ovarian cystectomy (OB-GYN) and Abdominoplasty with repair of ventral hernia (Gen-Surg), recovering appropriately.     Plan:  - pain control prn   - reg diet   - Gobal care per primary 49yFemale patient S/P Exploratory laparotomy, Total abdominal hysterectomy with bilateral salpingectomy, Open left ovarian cystectomy (OB-GYN) and repair of ventral hernia (Gen-Surg), recovering appropriately.     Plan:  - pain control prn   - reg diet   - Gobal care per primary

## 2025-05-17 NOTE — PATIENT PROFILE ADULT - FALL HARM RISK - HARM RISK INTERVENTIONS

## 2025-05-17 NOTE — PROGRESS NOTE ADULT - ASSESSMENT
A/P: 49y with PMH HTN POD#1 s/p Ex-Lap VINCENT BS, L ovarian cystectomy, primary ventral hernia repair (Frozen = benign fibroid). Patient doing well and recovering appropriately postop.      Neuro: Pain regimen: IV Tylenol, Toradol, Oxy PRN. Consider advancing to PO analgesia this afternoon   CV: hemodynamically stable, VSS, f/u AM CBC  - HTN: Restart home antihypertensives this AM: Nebivolol. Continue Lopressor PRN. Continue to hold Losartan and assess patient's BP throughout the day   Pulm: O2 sat WNL on RA, increase ambulation, encourage incentive spirometry use  GI: tolerating regular diet  - Senna, simethicone, Zofran PRN  : Ly in place, consider d/c after AM labs. UOP   - Replete electrolytes as needed, f/u AM BMP  - LR@125  Heme: Continue HSQ and SCDs while in bed for DVT ppx  ID: afebrile, no signs of infection  Endo: No active issues  Dispo: continue routine post-op care. Discharge planning.       Mary Cochran, PGY2  Seen and discussed with Gynecologic Oncology Team A/P: 49y with PMH HTN POD#1 s/p Ex-Lap VINCENT BS, L ovarian cystectomy, primary ventral hernia repair (Frozen = benign fibroid). Patient doing well and recovering appropriately postop.      Neuro: Pain regimen: IV Tylenol, Toradol, Oxy PRN. Consider advancing to PO analgesia this afternoon   CV: hemodynamically stable, VSS, f/u AM CBC  - HTN: Restart home antihypertensives this AM: Nebivolol. Continue Lopressor PRN. Continue to hold Losartan and assess patient's BP throughout the day   Pulm: O2 sat WNL on RA, increase ambulation, encourage incentive spirometry use  GI: tolerating regular diet  - Senna, simethicone, Zofran PRN  : Ly in place, consider d/c after AM labs. UOP overnight: 420cc  - Replete electrolytes as needed, f/u AM BMP  - LR@125  Heme: Continue HSQ and SCDs while in bed for DVT ppx  ID: afebrile, no signs of infection  Endo: No active issues  Dispo: continue routine post-op care. Discharge planning.       Mary Cochran, PGY2  Seen and discussed with Gynecologic Oncology Team

## 2025-05-17 NOTE — PROGRESS NOTE ADULT - SUBJECTIVE AND OBJECTIVE BOX
Anesthesia Pain Management Service    SUBJECTIVE: Patient s/p spinal morphine with pain managable and no problems. Denies headache, denies paresthesias.  Pain Scale Score:  Refer to charted pain scores    THERAPY:    s/p spinal PF morphine yesterday.      MEDICATIONS  (STANDING):  acetaminophen     Tablet .. 975 milliGRAM(s) Oral every 6 hours  chlorhexidine 2% Cloths 1 Application(s) Topical daily  enoxaparin Injectable 40 milliGRAM(s) SubCutaneous every 24 hours  ibuprofen  Tablet. 600 milliGRAM(s) Oral every 6 hours  lactated ringers. 1000 milliLiter(s) (145 mL/Hr) IV Continuous <Continuous>  lactated ringers. 1000 milliLiter(s) (30 mL/Hr) IV Continuous <Continuous>  montelukast 10 milliGRAM(s) Oral daily  nebivolol 10 milliGRAM(s) Oral every 24 hours    MEDICATIONS  (PRN):  metoprolol tartrate Injectable 5 milliGRAM(s) IV Push every 6 hours PRN give if BP >160/110  ondansetron Injectable 4 milliGRAM(s) IV Push every 6 hours PRN Nausea and/or Vomiting  oxyCODONE    IR 5 milliGRAM(s) Oral every 6 hours PRN Severe Pain (7 - 10)  senna 2 Tablet(s) Oral at bedtime PRN Constipation  simethicone 80 milliGRAM(s) Chew every 6 hours PRN Gas      OBJECTIVE: Patient sitting in bed.    Sedation Score:	[ x] Alert	[ ] Drowsy	[ ] Arousable	[ ] Asleep	[ ] Unresponsive    Side Effects:	[ x] None	[ ] Nausea	[ ] Vomiting	[ ] Pruritus  		  [ ] Weakness		[ ] Numbness	[ ] Other:    Vital Signs Last 24 Hrs  T(C): 36.7 (17 May 2025 11:42), Max: 37 (17 May 2025 05:40)  T(F): 98.1 (17 May 2025 11:42), Max: 98.6 (17 May 2025 05:40)  HR: 77 (17 May 2025 09:50) (60 - 77)  BP: 117/62 (17 May 2025 09:50) (117/62 - 162/85)  BP(mean): 103 (16 May 2025 20:00) (85 - 109)  RR: 18 (17 May 2025 11:42) (14 - 19)  SpO2: 98% (17 May 2025 11:42) (95% - 100%)    Parameters below as of 17 May 2025 11:42  Patient On (Oxygen Delivery Method): room air        ASSESSMENT/ PLAN  [x ] Patient transitioned to prn analgesics  [x] Pain management per primary service, pain service to sign off   [x]Documentation and Verification of current medications     Comments: PRN Oral/IV opioids and/or non-opioid Adjuvant analgesics to be used at this point.    Progress Note written now but Patient was seen earlier.

## 2025-05-17 NOTE — PATIENT PROFILE ADULT - LEGAL HELP
Phoned patient and left a message on his voice mail that his medication has been refilled for one month. no

## 2025-05-18 LAB
ANION GAP SERPL CALC-SCNC: 10 MMOL/L — SIGNIFICANT CHANGE UP (ref 7–14)
BASOPHILS # BLD AUTO: 0.01 K/UL — SIGNIFICANT CHANGE UP (ref 0–0.2)
BASOPHILS NFR BLD AUTO: 0.1 % — SIGNIFICANT CHANGE UP (ref 0–2)
BLD GP AB SCN SERPL QL: NEGATIVE — SIGNIFICANT CHANGE UP
BUN SERPL-MCNC: 21 MG/DL — SIGNIFICANT CHANGE UP (ref 7–23)
CALCIUM SERPL-MCNC: 8.3 MG/DL — LOW (ref 8.4–10.5)
CHLORIDE SERPL-SCNC: 104 MMOL/L — SIGNIFICANT CHANGE UP (ref 98–107)
CO2 SERPL-SCNC: 23 MMOL/L — SIGNIFICANT CHANGE UP (ref 22–31)
CREAT SERPL-MCNC: 1.14 MG/DL — SIGNIFICANT CHANGE UP (ref 0.5–1.3)
EGFR: 60 ML/MIN/1.73M2 — SIGNIFICANT CHANGE UP
EGFR: 60 ML/MIN/1.73M2 — SIGNIFICANT CHANGE UP
EOSINOPHIL # BLD AUTO: 0.11 K/UL — SIGNIFICANT CHANGE UP (ref 0–0.5)
EOSINOPHIL NFR BLD AUTO: 1.2 % — SIGNIFICANT CHANGE UP (ref 0–6)
GLUCOSE SERPL-MCNC: 85 MG/DL — SIGNIFICANT CHANGE UP (ref 70–99)
HCT VFR BLD CALC: 34.7 % — SIGNIFICANT CHANGE UP (ref 34.5–45)
HGB BLD-MCNC: 11.2 G/DL — LOW (ref 11.5–15.5)
IANC: 5.92 K/UL — SIGNIFICANT CHANGE UP (ref 1.8–7.4)
IMM GRANULOCYTES NFR BLD AUTO: 0.3 % — SIGNIFICANT CHANGE UP (ref 0–0.9)
LYMPHOCYTES # BLD AUTO: 1.48 K/UL — SIGNIFICANT CHANGE UP (ref 1–3.3)
LYMPHOCYTES # BLD AUTO: 16.7 % — SIGNIFICANT CHANGE UP (ref 13–44)
MAGNESIUM SERPL-MCNC: 1.6 MG/DL — SIGNIFICANT CHANGE UP (ref 1.6–2.6)
MCHC RBC-ENTMCNC: 28.7 PG — SIGNIFICANT CHANGE UP (ref 27–34)
MCHC RBC-ENTMCNC: 32.3 G/DL — SIGNIFICANT CHANGE UP (ref 32–36)
MCV RBC AUTO: 89 FL — SIGNIFICANT CHANGE UP (ref 80–100)
MONOCYTES # BLD AUTO: 1.3 K/UL — HIGH (ref 0–0.9)
MONOCYTES NFR BLD AUTO: 14.7 % — HIGH (ref 2–14)
NEUTROPHILS # BLD AUTO: 5.92 K/UL — SIGNIFICANT CHANGE UP (ref 1.8–7.4)
NEUTROPHILS NFR BLD AUTO: 67 % — SIGNIFICANT CHANGE UP (ref 43–77)
NRBC # BLD AUTO: 0 K/UL — SIGNIFICANT CHANGE UP (ref 0–0)
NRBC # FLD: 0 K/UL — SIGNIFICANT CHANGE UP (ref 0–0)
NRBC BLD AUTO-RTO: 0 /100 WBCS — SIGNIFICANT CHANGE UP (ref 0–0)
PHOSPHATE SERPL-MCNC: 2.6 MG/DL — SIGNIFICANT CHANGE UP (ref 2.5–4.5)
PLATELET # BLD AUTO: 170 K/UL — SIGNIFICANT CHANGE UP (ref 150–400)
POTASSIUM SERPL-MCNC: 4.2 MMOL/L — SIGNIFICANT CHANGE UP (ref 3.5–5.3)
POTASSIUM SERPL-SCNC: 4.2 MMOL/L — SIGNIFICANT CHANGE UP (ref 3.5–5.3)
RBC # BLD: 3.9 M/UL — SIGNIFICANT CHANGE UP (ref 3.8–5.2)
RBC # FLD: 13.4 % — SIGNIFICANT CHANGE UP (ref 10.3–14.5)
RH IG SCN BLD-IMP: POSITIVE — SIGNIFICANT CHANGE UP
SODIUM SERPL-SCNC: 137 MMOL/L — SIGNIFICANT CHANGE UP (ref 135–145)
WBC # BLD: 8.85 K/UL — SIGNIFICANT CHANGE UP (ref 3.8–10.5)
WBC # FLD AUTO: 8.85 K/UL — SIGNIFICANT CHANGE UP (ref 3.8–10.5)

## 2025-05-18 PROCEDURE — 99232 SBSQ HOSP IP/OBS MODERATE 35: CPT

## 2025-05-18 RX ADMIN — Medication 600 MILLIGRAM(S): at 01:19

## 2025-05-18 RX ADMIN — OXYCODONE HYDROCHLORIDE 5 MILLIGRAM(S): 30 TABLET ORAL at 06:52

## 2025-05-18 RX ADMIN — Medication 975 MILLIGRAM(S): at 09:28

## 2025-05-18 RX ADMIN — SODIUM CHLORIDE 125 MILLILITER(S): 9 INJECTION, SOLUTION INTRAVENOUS at 05:42

## 2025-05-18 RX ADMIN — NEBIVOLOL 10 MILLIGRAM(S): 2.5 TABLET ORAL at 09:26

## 2025-05-18 RX ADMIN — MONTELUKAST SODIUM 10 MILLIGRAM(S): 10 TABLET ORAL at 12:26

## 2025-05-18 RX ADMIN — Medication 975 MILLIGRAM(S): at 21:25

## 2025-05-18 RX ADMIN — Medication 5 MILLIGRAM(S): at 16:42

## 2025-05-18 RX ADMIN — Medication 600 MILLIGRAM(S): at 23:27

## 2025-05-18 RX ADMIN — ENOXAPARIN SODIUM 40 MILLIGRAM(S): 100 INJECTION SUBCUTANEOUS at 13:33

## 2025-05-18 RX ADMIN — Medication 975 MILLIGRAM(S): at 04:25

## 2025-05-18 RX ADMIN — METOPROLOL SUCCINATE 5 MILLIGRAM(S): 50 TABLET, EXTENDED RELEASE ORAL at 13:48

## 2025-05-18 RX ADMIN — Medication 600 MILLIGRAM(S): at 13:26

## 2025-05-18 RX ADMIN — Medication 600 MILLIGRAM(S): at 00:19

## 2025-05-18 RX ADMIN — Medication 1 APPLICATION(S): at 12:29

## 2025-05-18 RX ADMIN — OXYCODONE HYDROCHLORIDE 5 MILLIGRAM(S): 30 TABLET ORAL at 07:52

## 2025-05-18 RX ADMIN — Medication 975 MILLIGRAM(S): at 22:25

## 2025-05-18 RX ADMIN — Medication 975 MILLIGRAM(S): at 04:55

## 2025-05-18 RX ADMIN — Medication 975 MILLIGRAM(S): at 10:28

## 2025-05-18 RX ADMIN — Medication 600 MILLIGRAM(S): at 05:42

## 2025-05-18 RX ADMIN — Medication 600 MILLIGRAM(S): at 12:26

## 2025-05-18 RX ADMIN — Medication 600 MILLIGRAM(S): at 18:58

## 2025-05-18 RX ADMIN — Medication 600 MILLIGRAM(S): at 19:38

## 2025-05-18 NOTE — PROGRESS NOTE ADULT - SUBJECTIVE AND OBJECTIVE BOX
Surgery Progress Note    SUBJECTIVE:  - Patient seen and examined at bedside   --------------------------------------------------------------------------------------------------  OBJECTIVE:   Physical Exam:  General: AAOx3, NAD, lying comfortably in bed  HEENT: NC/AT  Respiratory: nonlabored breathing  Abdomen: softly distended, appropriately tender, surgical incisions are c/d/i.   --------------------------------------------------------------------------------------------------  V/S:  Vital Signs Last 24 Hrs  T(C): 36.6 (18 May 2025 09:20), Max: 37.2 (17 May 2025 21:48)  T(F): 97.8 (18 May 2025 09:20), Max: 99 (17 May 2025 21:48)  HR: 69 (18 May 2025 09:20) (64 - 79)  BP: 158/85 (18 May 2025 09:20) (138/89 - 158/85)  BP(mean): --  RR: 18 (18 May 2025 09:20) (16 - 19)  SpO2: 97% (18 May 2025 09:20) (96% - 100%)    Parameters below as of 18 May 2025 09:20  Patient On (Oxygen Delivery Method): room air        --------------------------------------------------------------------------------------------------  I/Os:    17 May 2025 07:01  -  18 May 2025 07:00  --------------------------------------------------------  IN:    Lactated Ringers: 2520 mL    Oral Fluid: 1434 mL  Total IN: 3954 mL    OUT:    Indwelling Catheter - Urethral (mL): 675 mL    IV PiggyBack: 0 mL    Voided (mL): 1550 mL  Total OUT: 2225 mL    Total NET: 1729 mL      18 May 2025 07:01  -  18 May 2025 13:56  --------------------------------------------------------  IN:    Oral Fluid: 240 mL  Total IN: 240 mL    OUT:    Voided (mL): 1000 mL  Total OUT: 1000 mL    Total NET: -760 mL        --------------------------------------------------------------------------------------------------  LABS:                        11.2   8.85  )-----------( 170      ( 18 May 2025 04:24 )             34.7     18 May 2025 04:24    137    |  104    |  21     ----------------------------<  85     4.2     |  23     |  1.14     Ca    8.3        18 May 2025 04:24  Phos  2.6       18 May 2025 04:24  Mg     1.60      18 May 2025 04:24    TPro  6.1    /  Alb  3.2    /  TBili  0.3    /  DBili  x      /  AST  18     /  ALT  9      /  AlkPhos  42     17 May 2025 04:18      CAPILLARY BLOOD GLUCOSE            LIVER FUNCTIONS - ( 17 May 2025 04:18 )  Alb: 3.2 g/dL / Pro: 6.1 g/dL / ALK PHOS: 42 U/L / ALT: 9 U/L / AST: 18 U/L / GGT: x             Urinalysis Basic - ( 18 May 2025 04:24 )    Color: x / Appearance: x / SG: x / pH: x  Gluc: 85 mg/dL / Ketone: x  / Bili: x / Urobili: x   Blood: x / Protein: x / Nitrite: x   Leuk Esterase: x / RBC: x / WBC x   Sq Epi: x / Non Sq Epi: x / Bacteria: x      --------------------------------------------------------------------------------------------------  MEDICATIONS  (STANDING):  acetaminophen     Tablet .. 975 milliGRAM(s) Oral every 6 hours  chlorhexidine 2% Cloths 1 Application(s) Topical daily  enoxaparin Injectable 40 milliGRAM(s) SubCutaneous every 24 hours  ibuprofen  Tablet. 600 milliGRAM(s) Oral every 6 hours  montelukast 10 milliGRAM(s) Oral daily  nebivolol 10 milliGRAM(s) Oral every 24 hours    MEDICATIONS  (PRN):  metoprolol tartrate Injectable 5 milliGRAM(s) IV Push every 6 hours PRN give if BP >160/110  ondansetron Injectable 4 milliGRAM(s) IV Push every 6 hours PRN Nausea and/or Vomiting  oxyCODONE    IR 5 milliGRAM(s) Oral every 6 hours PRN Severe Pain (7 - 10)  polyethylene glycol 3350 17 Gram(s) Oral daily PRN Constipation  senna 2 Tablet(s) Oral at bedtime PRN Constipation  simethicone 80 milliGRAM(s) Chew every 6 hours PRN Gas    --------------------------------------------------------------------------------------------------

## 2025-05-18 NOTE — PROGRESS NOTE ADULT - ASSESSMENT
A/P: 49y with PMH HTN POD#1 s/p Ex-Lap VINCENT BS, L ovarian cystectomy, primary ventral hernia repair (Frozen = benign fibroid). Patient doing well and recovering appropriately postop.      Neuro: Pain regimen: PO Tylenol, Motrin, Oxy PRN.   CV: hemodynamically stable, VSS  - H&H: 12.7/39.4->12/37  - HTN: Continue home Nebivolol. Continue Lopressor PRN. BP wnl overnight, continue to hold home Losartan.  Pulm: O2 sat WNL on RA, increase ambulation, encourage incentive spirometry use  GI: tolerating regular diet  - Senna, simethicone, Zofran PRN  : Voiding spontaneously   - Replete electrolytes as needed, f/u AM BMP  - SLIV  Heme: Continue HSQ and SCDs while in bed for DVT ppx  ID: afebrile, no signs of infection  Endo: No active issues  Dispo: continue routine post-op care. Discharge planning.       Mary Cochran, PGY2  Seen and discussed with Gynecologic Oncology Team

## 2025-05-18 NOTE — PROGRESS NOTE ADULT - SUBJECTIVE AND OBJECTIVE BOX
Gyn ONC Progress Note POD #2, HD#3    Subjective:   Patient seen and examined at bedside. No acute events overnight. No acute complaints. Pain well controlled. Patient ambulating without issue, tolerating regular diet, passing flatus and voiding spontaneously. Denies lightheadedness, dizziness, CP, SOB, N/V, fevers, and chills.      Objective:  T(F): 98.9 (05-18-25 @ 00:28), Max: 99 (05-17-25 @ 21:48)  HR: 64 (05-18-25 @ 00:28) (64 - 79)  BP: 138/89 (05-18-25 @ 00:28) (117/62 - 145/72)  RR: 16 (05-18-25 @ 00:28) (16 - 19)  SpO2: 99% (05-18-25 @ 00:28) (96% - 100%)  Wt(kg): --  I&O's Summary    16 May 2025 07:01  -  17 May 2025 07:00  --------------------------------------------------------  IN: 1940 mL / OUT: 555 mL / NET: 1385 mL    17 May 2025 07:01  -  18 May 2025 04:33  --------------------------------------------------------  IN: 3334 mL / OUT: 1625 mL / NET: 1709 mL      CAPILLARY BLOOD GLUCOSE          MEDICATIONS  (STANDING):  acetaminophen     Tablet .. 975 milliGRAM(s) Oral every 6 hours  chlorhexidine 2% Cloths 1 Application(s) Topical daily  enoxaparin Injectable 40 milliGRAM(s) SubCutaneous every 24 hours  ibuprofen  Tablet. 600 milliGRAM(s) Oral every 6 hours  lactated ringers. 1000 milliLiter(s) (125 mL/Hr) IV Continuous <Continuous>  montelukast 10 milliGRAM(s) Oral daily  nebivolol 10 milliGRAM(s) Oral every 24 hours    MEDICATIONS  (PRN):  metoprolol tartrate Injectable 5 milliGRAM(s) IV Push every 6 hours PRN give if BP >160/110  ondansetron Injectable 4 milliGRAM(s) IV Push every 6 hours PRN Nausea and/or Vomiting  oxyCODONE    IR 5 milliGRAM(s) Oral every 6 hours PRN Severe Pain (7 - 10)  polyethylene glycol 3350 17 Gram(s) Oral daily PRN Constipation  senna 2 Tablet(s) Oral at bedtime PRN Constipation  simethicone 80 milliGRAM(s) Chew every 6 hours PRN Gas      Physical Exam:  Constitutional: NAD, A+O x3  CV: RRR  Lungs: clear to auscultation bilaterally  Abdomen: soft, nondistended, no guarding, no rebound, normal bowel sounds  Incision: Midline vertical incision clean, dry, intact without erythema or drainage. Dermabond prineo in place with clean ABD pad and abdominal binder   Extremities: no lower extremity edema or calf tenderness bilaterally; venodynes in place    LABS:    05-17    135    |  103    |  14     ----------------------------<  103[H]  4.3     |  21[L]  |  1.04     Ca    8.2[L]      17 May 2025 04:18  Phos  3.7       05-17  Mg     1.70      05-17    TPro  6.1    /  Alb  3.2[L]  /  TBili  0.3    /  DBili  x      /  AST  18     /  ALT  9      /  AlkPhos  42     05-17          Urinalysis Basic - ( 17 May 2025 04:18 )    Color: x / Appearance: x / SG: x / pH: x  Gluc: 103 mg/dL / Ketone: x  / Bili: x / Urobili: x   Blood: x / Protein: x / Nitrite: x   Leuk Esterase: x / RBC: x / WBC x   Sq Epi: x / Non Sq Epi: x / Bacteria: x

## 2025-05-18 NOTE — PROGRESS NOTE ADULT - ASSESSMENT
49yFemale patient S/P Exploratory laparotomy, Total abdominal hysterectomy with bilateral salpingectomy, Open left ovarian cystectomy (OB-GYN) and repair of ventral hernia (Gen-Surg), recovering appropriately and continues to pass flatus    Plan:  - pain control prn   - reg diet   - OOB and ambulation  - NO lifting > 15 lbs  - Gobal care per primary    B Team surgery  [id number]

## 2025-05-19 ENCOUNTER — TRANSCRIPTION ENCOUNTER (OUTPATIENT)
Age: 50
End: 2025-05-19

## 2025-05-19 VITALS
SYSTOLIC BLOOD PRESSURE: 152 MMHG | RESPIRATION RATE: 17 BRPM | OXYGEN SATURATION: 100 % | DIASTOLIC BLOOD PRESSURE: 97 MMHG | HEART RATE: 63 BPM | TEMPERATURE: 98 F

## 2025-05-19 LAB
ANION GAP SERPL CALC-SCNC: 10 MMOL/L — SIGNIFICANT CHANGE UP (ref 7–14)
BASOPHILS # BLD AUTO: 0.02 K/UL — SIGNIFICANT CHANGE UP (ref 0–0.2)
BASOPHILS NFR BLD AUTO: 0.2 % — SIGNIFICANT CHANGE UP (ref 0–2)
BUN SERPL-MCNC: 14 MG/DL — SIGNIFICANT CHANGE UP (ref 7–23)
CALCIUM SERPL-MCNC: 8.5 MG/DL — SIGNIFICANT CHANGE UP (ref 8.4–10.5)
CHLORIDE SERPL-SCNC: 105 MMOL/L — SIGNIFICANT CHANGE UP (ref 98–107)
CO2 SERPL-SCNC: 25 MMOL/L — SIGNIFICANT CHANGE UP (ref 22–31)
CREAT SERPL-MCNC: 0.98 MG/DL — SIGNIFICANT CHANGE UP (ref 0.5–1.3)
EGFR: 72 ML/MIN/1.73M2 — SIGNIFICANT CHANGE UP
EGFR: 72 ML/MIN/1.73M2 — SIGNIFICANT CHANGE UP
EOSINOPHIL # BLD AUTO: 0.3 K/UL — SIGNIFICANT CHANGE UP (ref 0–0.5)
EOSINOPHIL NFR BLD AUTO: 3.4 % — SIGNIFICANT CHANGE UP (ref 0–6)
GLUCOSE BLDC GLUCOMTR-MCNC: 74 MG/DL — SIGNIFICANT CHANGE UP (ref 70–99)
GLUCOSE SERPL-MCNC: 85 MG/DL — SIGNIFICANT CHANGE UP (ref 70–99)
HCT VFR BLD CALC: 34 % — LOW (ref 34.5–45)
HGB BLD-MCNC: 11 G/DL — LOW (ref 11.5–15.5)
IANC: 5.48 K/UL — SIGNIFICANT CHANGE UP (ref 1.8–7.4)
IMM GRANULOCYTES NFR BLD AUTO: 0.3 % — SIGNIFICANT CHANGE UP (ref 0–0.9)
LYMPHOCYTES # BLD AUTO: 1.62 K/UL — SIGNIFICANT CHANGE UP (ref 1–3.3)
LYMPHOCYTES # BLD AUTO: 18.5 % — SIGNIFICANT CHANGE UP (ref 13–44)
MAGNESIUM SERPL-MCNC: 1.7 MG/DL — SIGNIFICANT CHANGE UP (ref 1.6–2.6)
MCHC RBC-ENTMCNC: 28.6 PG — SIGNIFICANT CHANGE UP (ref 27–34)
MCHC RBC-ENTMCNC: 32.4 G/DL — SIGNIFICANT CHANGE UP (ref 32–36)
MCV RBC AUTO: 88.3 FL — SIGNIFICANT CHANGE UP (ref 80–100)
MONOCYTES # BLD AUTO: 1.32 K/UL — HIGH (ref 0–0.9)
MONOCYTES NFR BLD AUTO: 15.1 % — HIGH (ref 2–14)
NEUTROPHILS # BLD AUTO: 5.48 K/UL — SIGNIFICANT CHANGE UP (ref 1.8–7.4)
NEUTROPHILS NFR BLD AUTO: 62.5 % — SIGNIFICANT CHANGE UP (ref 43–77)
NON-GYNECOLOGICAL CYTOLOGY STUDY: SIGNIFICANT CHANGE UP
NRBC # BLD AUTO: 0 K/UL — SIGNIFICANT CHANGE UP (ref 0–0)
NRBC # FLD: 0 K/UL — SIGNIFICANT CHANGE UP (ref 0–0)
NRBC BLD AUTO-RTO: 0 /100 WBCS — SIGNIFICANT CHANGE UP (ref 0–0)
PHOSPHATE SERPL-MCNC: 2.3 MG/DL — LOW (ref 2.5–4.5)
PLATELET # BLD AUTO: 174 K/UL — SIGNIFICANT CHANGE UP (ref 150–400)
POTASSIUM SERPL-MCNC: 4.5 MMOL/L — SIGNIFICANT CHANGE UP (ref 3.5–5.3)
POTASSIUM SERPL-SCNC: 4.5 MMOL/L — SIGNIFICANT CHANGE UP (ref 3.5–5.3)
RBC # BLD: 3.85 M/UL — SIGNIFICANT CHANGE UP (ref 3.8–5.2)
RBC # FLD: 13.6 % — SIGNIFICANT CHANGE UP (ref 10.3–14.5)
SODIUM SERPL-SCNC: 140 MMOL/L — SIGNIFICANT CHANGE UP (ref 135–145)
WBC # BLD: 8.77 K/UL — SIGNIFICANT CHANGE UP (ref 3.8–10.5)
WBC # FLD AUTO: 8.77 K/UL — SIGNIFICANT CHANGE UP (ref 3.8–10.5)

## 2025-05-19 PROCEDURE — 99232 SBSQ HOSP IP/OBS MODERATE 35: CPT

## 2025-05-19 PROCEDURE — 99223 1ST HOSP IP/OBS HIGH 75: CPT | Mod: GC

## 2025-05-19 RX ORDER — IBUPROFEN 200 MG
1 TABLET ORAL
Qty: 0 | Refills: 0 | DISCHARGE

## 2025-05-19 RX ORDER — TRAMADOL HYDROCHLORIDE 50 MG/1
1 TABLET, FILM COATED ORAL
Qty: 10 | Refills: 0
Start: 2025-05-19

## 2025-05-19 RX ORDER — MONTELUKAST SODIUM 10 MG/1
1 TABLET ORAL
Qty: 0 | Refills: 0 | DISCHARGE
Start: 2025-05-19

## 2025-05-19 RX ORDER — LOSARTAN POTASSIUM 100 MG/1
1 TABLET, FILM COATED ORAL
Qty: 30 | Refills: 0
Start: 2025-05-19

## 2025-05-19 RX ORDER — LOSARTAN POTASSIUM 100 MG/1
25 TABLET, FILM COATED ORAL DAILY
Refills: 0 | Status: DISCONTINUED | OUTPATIENT
Start: 2025-05-19 | End: 2025-05-19

## 2025-05-19 RX ORDER — ACETAMINOPHEN 500 MG/5ML
3 LIQUID (ML) ORAL
Qty: 0 | Refills: 0 | DISCHARGE
Start: 2025-05-19

## 2025-05-19 RX ORDER — TRAMADOL HYDROCHLORIDE 50 MG/1
1 TABLET, FILM COATED ORAL
Qty: 0 | Refills: 0 | DISCHARGE

## 2025-05-19 RX ORDER — NEBIVOLOL 2.5 MG/1
10 TABLET ORAL EVERY 24 HOURS
Refills: 0 | Status: DISCONTINUED | OUTPATIENT
Start: 2025-05-20 | End: 2025-05-19

## 2025-05-19 RX ADMIN — Medication 975 MILLIGRAM(S): at 04:31

## 2025-05-19 RX ADMIN — ENOXAPARIN SODIUM 40 MILLIGRAM(S): 100 INJECTION SUBCUTANEOUS at 14:05

## 2025-05-19 RX ADMIN — Medication 975 MILLIGRAM(S): at 05:31

## 2025-05-19 RX ADMIN — Medication 600 MILLIGRAM(S): at 05:44

## 2025-05-19 RX ADMIN — LOSARTAN POTASSIUM 25 MILLIGRAM(S): 100 TABLET, FILM COATED ORAL at 14:05

## 2025-05-19 RX ADMIN — Medication 1 APPLICATION(S): at 11:31

## 2025-05-19 RX ADMIN — Medication 975 MILLIGRAM(S): at 11:00

## 2025-05-19 RX ADMIN — Medication 600 MILLIGRAM(S): at 11:31

## 2025-05-19 RX ADMIN — Medication 600 MILLIGRAM(S): at 12:31

## 2025-05-19 RX ADMIN — Medication 600 MILLIGRAM(S): at 00:27

## 2025-05-19 RX ADMIN — Medication 600 MILLIGRAM(S): at 20:18

## 2025-05-19 RX ADMIN — Medication 975 MILLIGRAM(S): at 10:19

## 2025-05-19 RX ADMIN — Medication 600 MILLIGRAM(S): at 06:44

## 2025-05-19 RX ADMIN — MONTELUKAST SODIUM 10 MILLIGRAM(S): 10 TABLET ORAL at 11:31

## 2025-05-19 RX ADMIN — NEBIVOLOL 10 MILLIGRAM(S): 2.5 TABLET ORAL at 08:16

## 2025-05-19 NOTE — PROGRESS NOTE ADULT - ASSESSMENT
49yFemale patient S/P Exploratory laparotomy, Total abdominal hysterectomy with bilateral salpingectomy, Open left ovarian cystectomy (OB-GYN) and repair of ventral hernia (Gen-Surg), recovering appropriately and continues to pass flatus    Plan:  - pain control prn   - reg diet   - OOB and ambulation  - NO lifting > 15 lbs  - discharge planning per primary    B Team surgery  [id number]

## 2025-05-19 NOTE — DISCHARGE NOTE NURSING/CASE MANAGEMENT/SOCIAL WORK - NSDCPEFALRISK_GEN_ALL_CORE
For information on Fall & Injury Prevention, visit: [email or web address] OR  [email or web address] OR  https://www.cdc.gov/steadi/patient.html

## 2025-05-19 NOTE — PROGRESS NOTE ADULT - SUBJECTIVE AND OBJECTIVE BOX
Gyn ONC Progress Note POD #3 , HD#2    Subjective:   Patient seen and examined at bedside.  No acute events overnight. No acute complaints.  Pain well controlled.  Patient is ambulating without issue and tolerating a regular diet. Patient is passing flatus and voiding spontaneously.   Denies lightheadedness, dizziness, CP, SOB, N/V, fevers, and chills.    Objective:  T(F): 97.6 (05-19-25 @ 04:40), Max: 98.6 (05-18-25 @ 17:31)  HR: 62 (05-19-25 @ 05:44) (60 - 88)  BP: 154/97 (05-19-25 @ 05:44) (140/98 - 179/101)  RR: 16 (05-19-25 @ 04:40) (16 - 18)  SpO2: 100% (05-19-25 @ 04:40) (97% - 100%)  Wt(kg): --  I&O's Summary    17 May 2025 07:01  -  18 May 2025 07:00  --------------------------------------------------------  IN: 3954 mL / OUT: 2225 mL / NET: 1729 mL    18 May 2025 07:01  -  19 May 2025 06:47  --------------------------------------------------------  IN: 1060 mL / OUT: 3250 mL / NET: -2190 mL      CAPILLARY BLOOD GLUCOSE          MEDICATIONS  (STANDING):  acetaminophen     Tablet .. 975 milliGRAM(s) Oral every 6 hours  chlorhexidine 2% Cloths 1 Application(s) Topical daily  enoxaparin Injectable 40 milliGRAM(s) SubCutaneous every 24 hours  ibuprofen  Tablet. 600 milliGRAM(s) Oral every 6 hours  montelukast 10 milliGRAM(s) Oral daily  nebivolol 10 milliGRAM(s) Oral every 24 hours    MEDICATIONS  (PRN):  metoprolol tartrate Injectable 5 milliGRAM(s) IV Push every 6 hours PRN give if BP >160/110  ondansetron Injectable 4 milliGRAM(s) IV Push every 6 hours PRN Nausea and/or Vomiting  oxyCODONE    IR 5 milliGRAM(s) Oral every 6 hours PRN Severe Pain (7 - 10)  polyethylene glycol 3350 17 Gram(s) Oral daily PRN Constipation  senna 2 Tablet(s) Oral at bedtime PRN Constipation  simethicone 80 milliGRAM(s) Chew every 6 hours PRN Gas      Physical Exam:  Constitutional: NAD, A+O x3  CV: RRR  Lungs: clear to auscultation bilaterally  Abdomen: soft, nondistended, no guarding, no rebound, normal bowel sounds  Incision: Midline vertical incision clean, dry, intact without erythema or drainage. Dermabond prineo dressing in place w/ clean abd pad and abdominal binder.   Extremities: no lower extremity edema or calf tenderness bilaterally.     LABS:    05-19    140    |  105    |  14     ----------------------------<  85     4.5     |  25     |  0.98   05-18    137    |  104    |  21     ----------------------------<  85     4.2     |  23     |  1.14     Ca    8.5        19 May 2025 04:30  Ca    8.3[L]      18 May 2025 04:24  Phos  2.3       05-19  Phos  2.6       05-18  Mg     1.70      05-19  Mg     1.60      05-18            Urinalysis Basic - ( 19 May 2025 04:30 )    Color: x / Appearance: x / SG: x / pH: x  Gluc: 85 mg/dL / Ketone: x  / Bili: x / Urobili: x   Blood: x / Protein: x / Nitrite: x   Leuk Esterase: x / RBC: x / WBC x   Sq Epi: x / Non Sq Epi: x / Bacteria: x

## 2025-05-19 NOTE — CONSULT NOTE ADULT - SUBJECTIVE AND OBJECTIVE BOX
CHIEF COMPLAINT: Patient is a 49y old  Female who presents with a chief complaint of Scheduled surgery (16 May 2025 18:55)      HPI: HPI:  49 yr old female presents with known fibroids and chronic left pelvic pain, states pelvic pain has worsened recently and went to ER for further evaluation, CT noted enlarged lobular uterus with a predominant fundal lesion measuring 12.6 x 21.1 x 15.6 cm and 7.5 x 5.5 x 3.0 cm left ovarian cyst; Patient also has large ventral hernia. Scheduled for exploratory laparotomy, total abdominal hysterectomy, bilateral salpingectomy unilateral salpingo oophorectomy  excision of pelvic mass possible staging and Dr Neves to perform open anterior abdominal wall hernia repair with mesh  (12 May 2025 18:32)    Patient seen and examined, partner at bedside. She is POD#3 s/p Ex-Lap VINCENT BS, L ovarian cystectomy, primary ventral hernia repair (Frozen = benign fibroid). She reports her pain is well controlled currently. She is tolerating regular diet without nausea/vomiting. She is urinating without issues and is passing flatus, but has not had a BM yet.   In regards to her HTN, reports her SBP at home ranges from 130s-170s. She sees PCP Dr. Hogan for BP management, was on Losartan previously and tolerated it well but was switched to Bystolic about 2 months ago because she reports her BP numbers were very inconsistent. She reports trouble remembering to take her BP meds at home and often does not take them at same time of day. Discussed adding Losartan back to optimize BP control and to continue it on discharge along with Bystolic. She is in agreement with this. Recommended PCP appt in 1 week for BP check. Advised medication compliance and taking meds around the same time each day. Advised low salt diet.        Allergies: No Known Allergies    HOME MEDICATIONS: [x] Reviewed    MEDICATIONS  (STANDING):  acetaminophen     Tablet .. 975 milliGRAM(s) Oral every 6 hours  chlorhexidine 2% Cloths 1 Application(s) Topical daily  enoxaparin Injectable 40 milliGRAM(s) SubCutaneous every 24 hours  ibuprofen  Tablet. 600 milliGRAM(s) Oral every 6 hours  losartan 25 milliGRAM(s) Oral daily  montelukast 10 milliGRAM(s) Oral daily    MEDICATIONS  (PRN):  metoprolol tartrate Injectable 5 milliGRAM(s) IV Push every 6 hours PRN give if BP >160/110  ondansetron Injectable 4 milliGRAM(s) IV Push every 6 hours PRN Nausea and/or Vomiting  oxyCODONE    IR 5 milliGRAM(s) Oral every 6 hours PRN Severe Pain (7 - 10)  polyethylene glycol 3350 17 Gram(s) Oral daily PRN Constipation  senna 2 Tablet(s) Oral at bedtime PRN Constipation  simethicone 80 milliGRAM(s) Chew every 6 hours PRN Gas      PAST MEDICAL & SURGICAL HISTORY:  Leiomyoma uteri  Obese  HTN (hypertension)  Seasonal allergies  History of ventral hernia  H/O colonoscopy    SOCIAL HISTORY:  [x] Substance abuse: denies    [x] Tobacco: denies   [x] Alcohol use: denies     FAMILY HISTORY:  [x] No pertinent family history in first degree relatives       Vital Signs Last 24 Hrs  T(C): 37.1 (19 May 2025 08:16), Max: 37.1 (19 May 2025 08:16)  T(F): 98.7 (19 May 2025 08:16), Max: 98.7 (19 May 2025 08:16)  HR: 73 (19 May 2025 08:16) (60 - 88)  BP: 152/98 (19 May 2025 08:16) (140/98 - 179/101)  BP(mean): --  RR: 16 (19 May 2025 08:16) (16 - 18)  SpO2: 100% (19 May 2025 08:16) (99% - 100%)    Parameters below as of 19 May 2025 08:16  Patient On (Oxygen Delivery Method): room air        PHYSICAL EXAM:  GENERAL: NAD, well-groomed, well-developed  EYES: Conjunctiva and sclera clear  ENMT: Moist mucous membranes  NECK: Supple, No JVD  RESPIRATORY: Clear to auscultation bilaterally; No rales, rhonchi, wheezing, or rubs  CARDIOVASCULAR: Regular rate and rhythm; No murmurs, rubs, or gallops  GASTROINTESTINAL: Soft, Nontender, ABD binder in place, +bowel sounds   GENITOURINARY: Not examined  EXTREMITIES:  2+ Peripheral Pulses, No clubbing, cyanosis, or edema  NERVOUS SYSTEM:  Alert & Oriented X3; Moving all 4 extremities; No gross sensory deficits      LABS:                        11.0   8.77  )-----------( 174      ( 19 May 2025 04:30 )             34.0     05-19    140  |  105  |  14  ----------------------------<  85  4.5   |  25  |  0.98    Ca    8.5      19 May 2025 04:30  Phos  2.3     05-19  Mg     1.70     05-19        Urinalysis Basic - ( 19 May 2025 04:30 )    Color: x / Appearance: x / SG: x / pH: x  Gluc: 85 mg/dL / Ketone: x  / Bili: x / Urobili: x   Blood: x / Protein: x / Nitrite: x   Leuk Esterase: x / RBC: x / WBC x   Sq Epi: x / Non Sq Epi: x / Bacteria: x    EKG:   Personally Reviewed:  [x] YES     Imaging:   Personally Reviewed:  [x] YES               [x] Care Discussed with Consultants/Other Providers: Gyn-onc resident Dr. Castellanos

## 2025-05-19 NOTE — CONSULT NOTE ADULT - ASSESSMENT
49F hx of HTN, fibroid uterus now POD#3 s/p Ex-Lap VINCENT BS, L ovarian cystectomy, primary ventral hernia repair (Frozen = benign fibroid). Medicine consulted for HTN management.

## 2025-05-19 NOTE — PROGRESS NOTE ADULT - ATTENDING COMMENTS
Patient seen and examined  No nausea, vomiting, fever or chills  Tolerating PO intake; passing flatus  Pain controlled. Ambulating without issues    On exam: awake, alert and oriented  Breathing comfortably on room air; no cough  Abd is soft, not distended, appropriately tender. No recurrent hernia  Prineo dermabond in place  Abdominal binder re-applied    - diet as tolerated  - ambulate as tolerated; no lifting of more than 15 pounds  - outpatient follow up in 2 weeks after discharge  - no general surgery contra-indications for discharge
Patient seen and examined  Doing well  No nausea, vomiting ,fever or chills  Passing flatus; tolerating pO intake  Ambulating in halls  Pain controlled    On exam: awake, alert and oriented  Breathing comfortably on room air; no cough  Abd is soft, not distended, appropriately tender  No rebound, no guarding  Incision is clean, dry and intact. Prineo dermbaond as dressing. No surrounding erythema.  Abd binder re-positioned.    - diet as tolerated  - ambulate as tolerated; no lifting of more than 15 pounds  - outpatient follow up with me in 2 weeks after discharge  - no contra-indication from general surgery for discharge
Patient seen and examined  Doing well; no nausea, vomiting, fever or chills.  Passing some flatus  Abd pain controlled  Was able to walk to halls today    On exam: awake, alert and oriented  Breathing comfortably on room air; no cough  Abd is soft, not distended, appropriately tender  No rebound, no guarding  Prineo dermabond in place; no erythema, no ecchymosis  Abd binder re-applied    - ambulate as tolerated; no lifting of more than 15 pounds  - diet as tolerated
continue pain control  voiding trial passed  lovenox/scd's  re-eval for dc later today or ana am
advance diet   voiding trial once adequate u/o
Patient seen and examined.   Doing well after surgery.   Agree with assessment and plan as written by fellow and housestaff.   Stable for DC home today.

## 2025-05-19 NOTE — DISCHARGE NOTE NURSING/CASE MANAGEMENT/SOCIAL WORK - PATIENT PORTAL LINK FT
You can access the FollowMyHealth Patient Portal offered by [facility] by registering at the following website: [email or web address]. By joining [employer]’s FollowMyHealth portal, you will also be able to view your health information using other applications (apps) compatible with our system.

## 2025-05-19 NOTE — CONSULT NOTE ADULT - PROBLEM SELECTOR RECOMMENDATION 9
s/p Ex-Lap VINCENT BS, L ovarian cystectomy, primary ventral hernia repair (Frozen = benign fibroid) on 5/16  - Doing well post-operatively   - Care per primary Gyn-onc team and general surgery team   - Pain control + bowel regimen   - Encourage OOB and incentive spirometry   - DVT ppx: Lovenox

## 2025-05-19 NOTE — DISCHARGE NOTE NURSING/CASE MANAGEMENT/SOCIAL WORK - FINANCIAL ASSISTANCE
[facility] provides services at a reduced cost to those who are determined to be eligible through [facility]’s financial assistance program. Information regarding [facility]’s financial assistance program can be found by going to [email or web address] or by calling [phone].

## 2025-05-19 NOTE — PROGRESS NOTE ADULT - SUBJECTIVE AND OBJECTIVE BOX
GENERAL SURGERY PROGRESS NOTE    INTERVAL EVENTS: doing well. tolerating a regular diet, passing flatus, no BM      OBJECTIVE:    Vital Signs Last 24 Hrs  T(C): 36.4 (19 May 2025 04:40), Max: 37 (18 May 2025 17:31)  T(F): 97.6 (19 May 2025 04:40), Max: 98.6 (18 May 2025 17:31)  HR: 62 (19 May 2025 05:44) (60 - 88)  BP: 154/97 (19 May 2025 05:44) (140/98 - 179/101)  BP(mean): --  RR: 16 (19 May 2025 04:40) (16 - 18)  SpO2: 100% (19 May 2025 04:40) (97% - 100%)    Parameters below as of 19 May 2025 04:40  Patient On (Oxygen Delivery Method): room air        OBJECTIVE:   Physical Exam:  General: AAOx3, NAD, lying comfortably in bed  HEENT: NC/AT  Respiratory: nonlabored breathing  Abdomen: softly distended, appropriately tender, surgical incisions are c/d/i, abdominal binder in place    I&O's Summary    18 May 2025 07:01  -  19 May 2025 07:00  --------------------------------------------------------  IN: 1060 mL / OUT: 3250 mL / NET: -2190 mL      I&O's Detail    18 May 2025 07:01  -  19 May 2025 07:00  --------------------------------------------------------  IN:    Oral Fluid: 1060 mL  Total IN: 1060 mL    OUT:    IV PiggyBack: 0 mL    Voided (mL): 3250 mL  Total OUT: 3250 mL    Total NET: -2190 mL            LABS:                        11.0   8.77  )-----------( 174      ( 19 May 2025 04:30 )             34.0     05-19    140  |  105  |  14  ----------------------------<  85  4.5   |  25  |  0.98    Ca    8.5      19 May 2025 04:30  Phos  2.3     05-19  Mg     1.70     05-19        Urinalysis Basic - ( 19 May 2025 04:30 )    Color: x / Appearance: x / SG: x / pH: x  Gluc: 85 mg/dL / Ketone: x  / Bili: x / Urobili: x   Blood: x / Protein: x / Nitrite: x   Leuk Esterase: x / RBC: x / WBC x   Sq Epi: x / Non Sq Epi: x / Bacteria: x        RADIOLOGY & ADDITIONAL STUDIES:

## 2025-05-19 NOTE — DISCHARGE NOTE NURSING/CASE MANAGEMENT/SOCIAL WORK - NSDCPECAREGIVERED_GEN_ALL_CORE
pt ambulating, eating, voiding without difficulty. iv discontinued. no distress noted. patient has midline abdomen combine over prineo closure, abdomen binder in place. patient given supplies./No

## 2025-05-19 NOTE — CONSULT NOTE ADULT - PROBLEM SELECTOR RECOMMENDATION 2
BP noted to be persistently elevated, SBP 150s-170s  - Pain is currently adequately controlled  - Patient endorses high BP readings at home as well.   - Recommend: Start Losartan 25mg daily, continue w/ home nebivolol 10mg daily   - Recommend outpatient PCP follow-up within 1 week for BP check   - Would avoid aggressively lowering BP with IV medications as patient is likely chronically hypertensive  - Would discontinue IV lopressor as patient is already on beta blocker w/ nebivolol and HR in 60s   - If needed, can give PO hydralazine 25mg x1 for BP > 180/100

## 2025-05-19 NOTE — PROGRESS NOTE ADULT - NSPROGADDITIONALINFOA_GEN_ALL_CORE
Fellow addendum    VS & labs reviewed  H/H stable  Pain well controlled, cont current regimen  Tolerating reg diet, + flatus. Abd soft, ND. Incision C/D/I  voiding spontaneously  Encouraged ambulation & ISS  DVT ppx: lovenox, CHEs    Dispo: cont routine postop care    Zelda Brown MD
Fellow addendum    POD#3   VS & labs reviewed  H/H stable  Pain well controlled, cont current regimen  Tolerating reg diet, + flatus. Abd soft, ND. Incision C/D/I  voiding spontaneously  Encouraged ambulation & ISS  Persistently hypertensive, currently on home nebivolol. GHOS consult  DVT ppx: lovenox SCDs    Dispo: anticipate dc today    Zelda Brown MD
Fellow addendum    VS & labs reviewed  H/H stable  Pain well controlled, transition to PO meds  Tolerating reg diet, not yet passing flatus. Abd soft, ND. Incision C/D/I  UOP low, likely underresuscitated. Increase mIVF and remove keyes once adequate  Encouraged ambulation & ISS  DVT ppx: Switch to lovenox, SCDs    Dispo: cont routine postop care    Zelda Brown MD

## 2025-05-27 ENCOUNTER — APPOINTMENT (OUTPATIENT)
Dept: GYNECOLOGIC ONCOLOGY | Facility: CLINIC | Age: 50
End: 2025-05-27
Payer: MEDICAID

## 2025-05-27 VITALS
OXYGEN SATURATION: 95 % | SYSTOLIC BLOOD PRESSURE: 157 MMHG | WEIGHT: 223 LBS | HEART RATE: 68 BPM | TEMPERATURE: 98.3 F | DIASTOLIC BLOOD PRESSURE: 108 MMHG | HEIGHT: 65 IN | BODY MASS INDEX: 37.15 KG/M2

## 2025-05-27 DIAGNOSIS — T81.89XA OTHER COMPLICATIONS OF PROCEDURES, NOT ELSEWHERE CLASSIFIED, INITIAL ENCOUNTER: ICD-10-CM

## 2025-05-27 PROBLEM — I10 ESSENTIAL (PRIMARY) HYPERTENSION: Chronic | Status: ACTIVE | Noted: 2025-05-12

## 2025-05-27 PROBLEM — E66.9 OBESITY, UNSPECIFIED: Chronic | Status: ACTIVE | Noted: 2025-05-12

## 2025-05-27 PROBLEM — J30.2 OTHER SEASONAL ALLERGIC RHINITIS: Chronic | Status: ACTIVE | Noted: 2025-05-12

## 2025-05-27 PROBLEM — D25.9 LEIOMYOMA OF UTERUS, UNSPECIFIED: Chronic | Status: ACTIVE | Noted: 2025-05-12

## 2025-05-27 PROBLEM — Z87.19 PERSONAL HISTORY OF OTHER DISEASES OF THE DIGESTIVE SYSTEM: Chronic | Status: ACTIVE | Noted: 2025-05-12

## 2025-05-27 LAB — SURGICAL PATHOLOGY STUDY: SIGNIFICANT CHANGE UP

## 2025-05-27 PROCEDURE — 99024 POSTOP FOLLOW-UP VISIT: CPT

## 2025-05-30 ENCOUNTER — NON-APPOINTMENT (OUTPATIENT)
Age: 50
End: 2025-05-30

## 2025-06-16 ENCOUNTER — APPOINTMENT (OUTPATIENT)
Dept: GYNECOLOGIC ONCOLOGY | Facility: CLINIC | Age: 50
End: 2025-06-16
Payer: MEDICAID

## 2025-06-16 VITALS
BODY MASS INDEX: 37.32 KG/M2 | RESPIRATION RATE: 18 BRPM | DIASTOLIC BLOOD PRESSURE: 85 MMHG | WEIGHT: 224 LBS | HEART RATE: 76 BPM | OXYGEN SATURATION: 98 % | TEMPERATURE: 97.8 F | SYSTOLIC BLOOD PRESSURE: 138 MMHG | HEIGHT: 65 IN

## 2025-06-16 PROCEDURE — 99024 POSTOP FOLLOW-UP VISIT: CPT

## 2025-06-19 ENCOUNTER — APPOINTMENT (OUTPATIENT)
Dept: SURGERY | Facility: CLINIC | Age: 50
End: 2025-06-19
Payer: MEDICAID

## 2025-06-19 VITALS
HEIGHT: 65 IN | RESPIRATION RATE: 16 BRPM | OXYGEN SATURATION: 98 % | SYSTOLIC BLOOD PRESSURE: 136 MMHG | DIASTOLIC BLOOD PRESSURE: 86 MMHG | HEART RATE: 88 BPM | WEIGHT: 224 LBS | BODY MASS INDEX: 37.32 KG/M2 | TEMPERATURE: 97.4 F

## 2025-06-19 PROCEDURE — 99212 OFFICE O/P EST SF 10 MIN: CPT

## 2025-06-30 ENCOUNTER — APPOINTMENT (OUTPATIENT)
Dept: GYNECOLOGIC ONCOLOGY | Facility: CLINIC | Age: 50
End: 2025-06-30
Payer: MEDICAID

## 2025-06-30 PROCEDURE — 99024 POSTOP FOLLOW-UP VISIT: CPT

## 2025-07-03 ENCOUNTER — APPOINTMENT (OUTPATIENT)
Dept: SURGERY | Facility: CLINIC | Age: 50
End: 2025-07-03
Payer: MEDICAID

## 2025-07-03 VITALS
TEMPERATURE: 97.9 F | BODY MASS INDEX: 37.32 KG/M2 | HEIGHT: 65 IN | OXYGEN SATURATION: 97 % | DIASTOLIC BLOOD PRESSURE: 89 MMHG | WEIGHT: 224 LBS | SYSTOLIC BLOOD PRESSURE: 135 MMHG | HEART RATE: 77 BPM

## 2025-07-03 PROCEDURE — 99212 OFFICE O/P EST SF 10 MIN: CPT

## 2025-07-17 ENCOUNTER — APPOINTMENT (OUTPATIENT)
Dept: SURGERY | Facility: CLINIC | Age: 50
End: 2025-07-17
Payer: MEDICAID

## 2025-07-17 VITALS
SYSTOLIC BLOOD PRESSURE: 131 MMHG | WEIGHT: 224 LBS | OXYGEN SATURATION: 99 % | HEART RATE: 80 BPM | DIASTOLIC BLOOD PRESSURE: 88 MMHG | TEMPERATURE: 98 F | HEIGHT: 65 IN | RESPIRATION RATE: 16 BRPM | BODY MASS INDEX: 37.32 KG/M2

## 2025-07-17 PROCEDURE — 99212 OFFICE O/P EST SF 10 MIN: CPT

## 2025-08-25 ENCOUNTER — APPOINTMENT (OUTPATIENT)
Dept: SURGERY | Facility: CLINIC | Age: 50
End: 2025-08-25
Payer: MEDICAID

## 2025-08-25 VITALS
BODY MASS INDEX: 37.32 KG/M2 | TEMPERATURE: 97.6 F | WEIGHT: 224 LBS | HEIGHT: 65 IN | HEART RATE: 96 BPM | RESPIRATION RATE: 16 BRPM | SYSTOLIC BLOOD PRESSURE: 122 MMHG | OXYGEN SATURATION: 97 % | DIASTOLIC BLOOD PRESSURE: 82 MMHG

## 2025-08-25 PROCEDURE — 99212 OFFICE O/P EST SF 10 MIN: CPT

## (undated) DEVICE — Device

## (undated) DEVICE — GLV 6 PROTEXIS (WHITE)

## (undated) DEVICE — DRSG TELFA 3 X 8

## (undated) DEVICE — ABDOMINAL BINDER MED/LG 12" X 36"-54"

## (undated) DEVICE — DRSG MEDIPORE + PAD 3.5X10"

## (undated) DEVICE — PACK PERI GYN

## (undated) DEVICE — SYR LUER LOK 20CC

## (undated) DEVICE — POSITIONER STRAP ARMBOARD VELCRO TS-30

## (undated) DEVICE — NDL HYPO SAFE 22G X 1.5" (BLACK)

## (undated) DEVICE — GLV 5.5 PROTEXIS (CREAM) MICRO

## (undated) DEVICE — DRSG MEPILEX 10 X 20CM (4 X 8") WHITE

## (undated) DEVICE — ELCTR GROUNDING PAD ADULT COVIDIEN

## (undated) DEVICE — DRAPE 3/4 SHEET 52X76"

## (undated) DEVICE — TUBING IRR SET FOR CYSTOSCOPY 77"

## (undated) DEVICE — SUT VICRYL 2-0 27" CT-1 UNDYED

## (undated) DEVICE — SPONGE X-RAY 4X8"

## (undated) DEVICE — DRAPE LAPAROTOMY W VELCRO CORD TABS

## (undated) DEVICE — ELCTR BOVIE TIP BLADE INSULATED 6.5" EDGE

## (undated) DEVICE — LIGASURE IMPACT

## (undated) DEVICE — ELCTR EXTENSION STRAIGHT

## (undated) DEVICE — ELCTR BOVIE PENCIL SMOKE EVACUATION

## (undated) DEVICE — SPONGE DISSECTOR PEANUT

## (undated) DEVICE — PREP BETADINE KIT

## (undated) DEVICE — WARMING BLANKET LOWER ADULT

## (undated) DEVICE — SUT PDS II 2-0 27" SH

## (undated) DEVICE — SUT VICRYL 0 36" CT-1 UNDYED

## (undated) DEVICE — SUT MONOCRYL 4-0 27" PS-2 UNDYED

## (undated) DEVICE — SOL IRR POUR H2O 500ML

## (undated) DEVICE — VENODYNE/SCD SLEEVE CALF MEDIUM

## (undated) DEVICE — PACK MAJOR ABDOMINAL WITH LAP

## (undated) DEVICE — FOLEY TRAY 16FR 5CC LF UMETER CLOSED

## (undated) DEVICE — SOL IRR POUR NS 0.9% 1500ML

## (undated) DEVICE — STAPLER SKIN MULTI DIRECTION W35

## (undated) DEVICE — DRAPE TOWEL BLUE 17" X 24"

## (undated) DEVICE — STAPLER SKIN VISI-STAT 35 WIDE

## (undated) DEVICE — SUT QUILL MONODERM 3-0 30CM PS-2

## (undated) DEVICE — DRSG CURITY GAUZE SPONGE 4 X 4" 12-PLY

## (undated) DEVICE — DRSG TEGADERM 4 X 4.75"

## (undated) DEVICE — SOL IRR POUR H2O 1500ML

## (undated) DEVICE — LABELS BLANK W PEN

## (undated) DEVICE — BASIN SET DOUBLE

## (undated) DEVICE — POSITIONER PINK PAD PIGAZZI SYSTEM

## (undated) DEVICE — TRAP SPECIMEN SPUTUM 40CC

## (undated) DEVICE — DRAPE WARMING SOLUTION 44 X 44"

## (undated) DEVICE — SUT VICRYL 0 18" TIES UNDYED

## (undated) DEVICE — LIJ/LIA-ESU VALLEYLAB FORCE TRIAD T2D28932EX: Type: DURABLE MEDICAL EQUIPMENT

## (undated) DEVICE — ELCTR BOVIE TIP BLADE INSULATED 2.75" EDGE